# Patient Record
Sex: MALE | Race: WHITE | Employment: OTHER | URBAN - METROPOLITAN AREA
[De-identification: names, ages, dates, MRNs, and addresses within clinical notes are randomized per-mention and may not be internally consistent; named-entity substitution may affect disease eponyms.]

---

## 2019-04-05 ENCOUNTER — OFFICE VISIT (OUTPATIENT)
Dept: NEUROLOGY | Age: 79
End: 2019-04-05

## 2019-04-05 VITALS
TEMPERATURE: 97.6 F | OXYGEN SATURATION: 98 % | DIASTOLIC BLOOD PRESSURE: 80 MMHG | RESPIRATION RATE: 18 BRPM | SYSTOLIC BLOOD PRESSURE: 144 MMHG | HEART RATE: 71 BPM | HEIGHT: 72 IN | WEIGHT: 182 LBS | BODY MASS INDEX: 24.65 KG/M2

## 2019-04-05 DIAGNOSIS — R26.9 GAIT ABNORMALITY: ICD-10-CM

## 2019-04-05 DIAGNOSIS — I69.354 HEMIPARESIS AFFECTING LEFT SIDE AS LATE EFFECT OF CEREBROVASCULAR ACCIDENT (CVA) (HCC): ICD-10-CM

## 2019-04-05 DIAGNOSIS — R29.6 FREQUENT FALLS: ICD-10-CM

## 2019-04-05 DIAGNOSIS — R25.8 BRADYKINESIA: Primary | ICD-10-CM

## 2019-04-05 DIAGNOSIS — R25.8 BRADYKINESIA: ICD-10-CM

## 2019-04-05 RX ORDER — MECLIZINE HCL 12.5 MG 12.5 MG/1
6.25-12.5 TABLET ORAL
COMMUNITY

## 2019-04-05 RX ORDER — ASPIRIN 325 MG
325 TABLET ORAL DAILY
COMMUNITY
End: 2019-05-13 | Stop reason: ALTCHOICE

## 2019-04-05 RX ORDER — OXYBUTYNIN CHLORIDE 15 MG/1
1 TABLET, EXTENDED RELEASE ORAL DAILY
COMMUNITY
Start: 2019-03-28 | End: 2019-05-01

## 2019-04-05 RX ORDER — METFORMIN HYDROCHLORIDE 1000 MG/1
1 TABLET ORAL 2 TIMES DAILY WITH MEALS
COMMUNITY
Start: 2019-01-31

## 2019-04-05 RX ORDER — LOSARTAN POTASSIUM 50 MG/1
1 TABLET ORAL DAILY
COMMUNITY
Start: 2019-03-29

## 2019-04-05 NOTE — PROGRESS NOTES
Chief Complaint Patient presents with  New Patient  Neurologic Problem  
  imbalance with multiple falls since CVA in 2015 along with weakness on L side  Went to [facility]. Unsure if had neuro consult afterwards Record request faxed to [facility] for imaging, lab, hosp notes and [facility] for PT notes

## 2019-04-05 NOTE — PATIENT INSTRUCTIONS
Preventing Falls: Care Instructions Your Care Instructions Getting around your home safely can be a challenge if you have injuries or health problems that make it easy for you to fall. Loose rugs and furniture in walkways are among the dangers for many older people who have problems walking or who have poor eyesight. People who have conditions such as arthritis, osteoporosis, or dementia also have to be careful not to fall. You can make your home safer with a few simple measures. Follow-up care is a key part of your treatment and safety. Be sure to make and go to all appointments, and call your doctor if you are having problems. It's also a good idea to know your test results and keep a list of the medicines you take. How can you care for yourself at home? Taking care of yourself · You may get dizzy if you do not drink enough water. To prevent dehydration, drink plenty of fluids, enough so that your urine is light yellow or clear like water. Choose water and other caffeine-free clear liquids. If you have kidney, heart, or liver disease and have to limit fluids, talk with your doctor before you increase the amount of fluids you drink. · Exercise regularly to improve your strength, muscle tone, and balance. Walk if you can. Swimming may be a good choice if you cannot walk easily. · Have your vision and hearing checked each year or any time you notice a change. If you have trouble seeing and hearing, you might not be able to avoid objects and could lose your balance. · Know the side effects of the medicines you take. Ask your doctor or pharmacist whether the medicines you take can affect your balance. Sleeping pills or sedatives can affect your balance. · Limit the amount of alcohol you drink. Alcohol can impair your balance and other senses. · Ask your doctor whether calluses or corns on your feet need to be removed.  If you wear loose-fitting shoes because of calluses or corns, you can lose your balance and fall. · Talk to your doctor if you have numbness in your feet. Preventing falls at home · Remove raised doorway thresholds, throw rugs, and clutter. Repair loose carpet or raised areas in the floor. · Move furniture and electrical cords to keep them out of walking paths. · Use nonskid floor wax, and wipe up spills right away, especially on ceramic tile floors. · If you use a walker or cane, put rubber tips on it. If you use crutches, clean the bottoms of them regularly with an abrasive pad, such as steel wool. · Keep your house well lit, especially [place] Vanda, and outside walkways. Use night-lights in areas such as hallways and bathrooms. Add extra light switches or use remote switches (such as switches that go on or off when you clap your hands) to make it easier to turn lights on if you have to get up during the night. · Install sturdy handrails on stairways. · Move items in your cabinets so that the things you use a lot are on the lower shelves (about waist level). · Keep a cordless phone and a flashlight with new batteries by your bed. If possible, put a phone in each of the main rooms of your house, or carry a cell phone in case you fall and cannot reach a phone. Or, you can wear a device around your neck or wrist. You push a button that sends a signal for help. · Wear low-heeled shoes that fit well and give your feet good support. Use footwear with nonskid soles. Check the heels and soles of your shoes for wear. Repair or replace worn heels or soles. · Do not wear socks without shoes on wood floors. · Walk on the grass when the sidewalks are slippery. If you live in an area that gets snow and ice in the winter, sprinkle salt on slippery steps and sidewalks. Preventing falls in the bath · Install grab bars and nonskid mats inside and outside your shower or tub and near the toilet and sinks. · Use shower chairs and bath benches. · Use a hand-held shower head that will allow you to sit while showering. · Get into a tub or shower by putting the weaker leg in first. Get out of a tub or shower with your strong side first. 
· Repair loose toilet seats and consider installing a raised toilet seat to make getting on and off the toilet easier. · Keep your bathroom door unlocked while you are in the shower. Where can you learn more? Go to http://fabrizio-ibeth.info/. Enter 0476 [id number] in the search box to learn more about \"Preventing Falls: Care Instructions. \" Current as of: March 15, 2018 Content Version: 11.9 © 6650-2030 [employer]. Care instructions adapted under license by [employer] (which disclaims liability or warranty for this information). If you have questions about a medical condition or this instruction, always ask your healthcare professional. [place] [address] any warranty or liability for your use of this information. How to Get Up Safely After a Fall: Care Instructions Your Care Instructions If you have injuries, health problems, or other reasons that may make it easy for you to fall at home, it is a good idea to learn how to get up safely after a fall. Learning how to get up correctly can help you avoid making an injury worse. Also, knowing what to do if you cannot get up can help you stay safe until help arrives. Follow-up care is a key part of your treatment and safety. Be sure to make and go to all appointments, and call your doctor if you are having problems. It's also a good idea to know your test results and keep a list of the medicines you take. How can you care for yourself after a fall? If you think you can get up First lie still for a few minutes and think about how you feel. If your body feels okay and you think you can get up safely, follow the rest of the steps below: 1. Look for a chair or other piece of furniture that is close to you. 2. Roll onto your side and rest. Roll by turning your head in the direction you want to roll, move your shoulder and arm, then hip and leg in the same direction. 3. Lie still for a moment to let your blood pressure adjust. 
4. Slowly push your upper body up, lift your head, and take a moment to rest. 
5. Slowly get up on your hands and knees, and crawl to the chair or other stable piece of furniture. 6. Put your hands on the chair. 7. Move one foot forward, and place it flat on the floor. Your other leg should be bent with the knee on the floor. 8. Rise slowly, turn your body, and sit in the chair. Stay seated for a bit and think about how you feel. Call for help. Even if you feel okay, let someone know what happened to you. You might not know that you have a serious injury. If you cannot get up 1. If you think you are injured after a fall or you cannot get up, try not to panic. 2. Call out for help. 3. If you have a phone within reach or you have an emergency call device, use it to call for help. 4. If you do not have a phone within reach, try to slide yourself toward it. If you cannot get to the phone, try to slide toward a door or window or a place where you think you can be heard. 5. [place] or use an object to make noise so someone might hear you. 6. If you can reach something that you can use for a pillow, place it under your head. Try to stay warm by covering yourself with a blanket or clothing while you wait for help. When should you call for help? Call 911 anytime you think you may need emergency care. For example, call if: 
  · You passed out (lost consciousness).  
  · You cannot get up after a fall.  
  · You have severe pain.  
 Call your doctor now or seek immediate medical care if: 
  · You have new or worse pain.  
  · You are dizzy or lightheaded.  
  · You hit your head.  
 Watch closely for changes in your health, and be sure to contact your doctor if:   · You do not get better as expected. Where can you learn more? Go to http://fabrizio-ibeth.info/. Enter E650 in the search box to learn more about \"How to Get Up Safely After a Fall: Care Instructions. \" Current as of: March 15, 2018 Content Version: 11.9 © 2526-5976 [email or web address], [employer]. Care instructions adapted under license by [employer] (which disclaims liability or warranty for this information). If you have questions about a medical condition or this instruction, always ask your healthcare professional. [address] any warranty or liability for your use of this information.

## 2019-04-05 NOTE — PROGRESS NOTES
[facility] [place] [facility] [address] at [facility] at [facility] [id number] [place], [address] [address], [address] [place] [address] 
[phone] Office 
[phone] Referring: Self Chief Complaint Patient presents with  New Patient  Neurologic Problem  
  imbalance with multiple falls since CVA in 2015 along with weakness on L side  Went to [facility]. Unsure if had neuro consult afterwards 72-year-old gentleman who presents today coming by his wife for complaints of balance difficulty. His wife provides most of the history although neither are great historians. My understanding is this gentleman had a stroke April 19, 2015 and he was treated at [facility].  No etiology for the stroke was given as far as the family knows however he has been following with vascular surgery and Dr. Crissy Floyd did a carotid endarterectomy in December 2016. Patient is on aspirin. He has diabetes and troll. Since his stroke he has had difficulty with ambulating but his wife feels that he has had increasing and progressive difficulty with ambulating. He said physical therapy off and on since his stroke. She discussed his issues with Dr. Rasta Parks of ENT who suggested the patient have some balance therapy. He has been getting about 4 weeks of that thus far and apparently the therapist have been concerned that there may be a neurologic process ongoing and recommended a neurologic evaluation. They were supposed to send some information however I do not have that either in the computer system or in the paper world here in the office. Wife is unsure as to what they were concerned about. She does say that he falls frequently and she tracks it and keeps a list of when he falls down but she does not have that with her today.   She believes she is fallen in excess of 8 times over the last year. He does not injure himself. He says he falls in any direction but primarily he tends to fall backwards. He believes is been ongoing for about 2 years now. He does not believe his voice is changed but his wife thinks he is talking softer. She believes she moves slowly. There is no tremor. He has baseline left-sided weakness. She believes he shuffles his feet. He says that he drags the left foot from the stroke. He is not had any recent chest pain or palpitations. No fever or chills. Feels generally weak. No spinning of the room. Past Medical History:  
Diagnosis Date  Diabetes ([facility] 2008  Prostate cancer ([facility] 08/2000  Stroke ([facility] 04/19/2015 Past Surgical History:  
Procedure Laterality Date  HX PROSTATECTOMY  08/2000  VASCULAR SURGERY PROCEDURE UNLIST  12/09/2016  
 carotid artery Allergies Allergen Reactions  Ciprofloxacin Shortness of Breath  Levaquin [Levofloxacin] Shortness of Breath  Guaifenesin Unknown (comments) Used with Cipro so unsure of true allergy Social History Tobacco Use  Smoking status: Never Smoker  Smokeless tobacco: Never Used Substance Use Topics  Alcohol use: Not Currently  Drug use: Never Family History Problem Relation Age of Onset  Heart Disease Mother  Prostate Cancer Father Review of Systems Pertinent positives and negatives as noted with remainder of comprehensive review negative Examination Visit Vitals /80 (BP 1 Location: Left arm, BP Patient Position: Sitting) Pulse 71 Temp 97.6 °F (36.4 °C) (Oral) Resp 18 Ht 6' (1.829 m) Wt 82.6 kg (182 lb) SpO2 98% BMI 24.68 kg/m² Pleasant. Dress and grooming are appropriate. No scleral icterus is present. Oropharynx is clear. Supple neck without bruit appreciated. Heart regular.   Pulses are symmetrical.  No edema in the lower extremities. He has a paucity of spontaneous speech Neurologically, he is awake, alert, and he is oriented. He has a paucity of spontaneous speech. He is slow to respond. He is slightly dysarthric. He is quite hypophonic. No drooling is noted. He has reactive pupils. Disc margins are not well seen. He has full horizontal gaze aversion and there is a suggestion of some decreased downward gaze bilaterally. He has a left facial droop. Tongue and palate are midline. Motor examination finds that he has masked facies. He is bradykinetic. He has increased tone in the left upper and lower extremity versus the right. He does have some mild cogwheeling at the wrists bilaterally and this seems greater on the right than the left. No visible tremor today. He has no pronation and no drift. He resists fully in the right upper and lower extremity to direct testing in all groups. In the left he is 4+/5 at the deltoid and triceps and . Remainder full. In the lower extremity 4+/5 at the psoas muscle and with the anterior tibialis he has foot drop. He has brisk reflexes left upper and lower versus the right with Serjio in place. Toes withdrawal.  He has no ataxia. He ambulates with a Rollator normally. He is able to get off of the examination table with some assistance for balance. He drags the left foot catching it on the stool/step coming off of the table. He ambulates with a short stride and he has a left hemiparetic type gait but in addition he also shuffles more so with the left due to the stroke but he shuffles with his right as well. He is imbalance when trying to sit down in the chair but he does reach behind him to orient himself to the chair before he sits as he has been instructed with therapy. He has no Romberg sign present. Remainder sensory is intact to primary. Impression/Plan Interesting 79-year-old gentleman with what sounds to be a progressive gait disturbance status post stroke in 2015 although no etiology known he did have a carotid endarterectomy right sided which would seem to indicate carotid disease was his etiology. He continues to modify modifiable risk factors for stroke and he should continue to do so including his aspirin and blood pressure control and keeping his diabetes under control as well. The gait disorder frequent falls are interesting in that he does have baseline hemiparesis secondary to the stroke left-sided and that certainly going to affect his gait and make him more prone to falls however his mask facies bradykinesia gaze limitation and small stride give him a parkinsonian type appearance. The gaze limitation would suggest a parkinsonian syndrome rather than a true Parkinson's and he has no tremor as would be more common with an idiopathic Parkinson's type syndrome. This also could be vascular. No medications he has listed would be inferred as causative We will get records from [facility] 
 
We will send him for an updated MRI of the brain particularly looking for midbrain atrophy as would be seen with multiple systems atrophy. EEG secondary to his slowness of responses etc.  We will also get an EMG to evaluate for any potential confounding that a diabetic peripheral neuropathy may be having in terms of his imbalance also looking for any neuromuscular explanation. When he returns if we do not have an answer based upon the test above solidified then it may be worthwhile to give him a Sinemet trial 
 
Follow-up after his testing Emiliano Anaya MD 
 
This note was created using voice recognition software. Despite editing, there may be syntax errors. This note will not be viewable in [address]

## 2019-04-10 ENCOUNTER — OFFICE VISIT (OUTPATIENT)
Dept: NEUROLOGY | Age: 79
End: 2019-04-10

## 2019-04-10 DIAGNOSIS — I69.354 HEMIPARESIS AFFECTING LEFT SIDE AS LATE EFFECT OF CEREBROVASCULAR ACCIDENT (CVA) (HCC): Primary | ICD-10-CM

## 2019-04-10 DIAGNOSIS — G62.9 PERIPHERAL NERVE DISORDER: Primary | ICD-10-CM

## 2019-04-10 NOTE — PROGRESS NOTES
EMG/ NCS Report  [facility]ATION  - [facility]  [facility], [place]  [facility], [address] [place], [address]   Ph: [phone]   FAX: [phone]/ [phone]  Test Date:  4/10/2019    Patient: Jagdish Bonilla : 1940 Physician: Babatunde Self, Audrey Ruff MD   Sex: Male Height: ' \" Ref Phys: Nisha Piper MD   ID#: [id number] Weight:  lbs. Technician: Fely Montgomery     Patient History / Exam:  Bilateral LE gait disorder          EMG & NCV Findings:  Evaluation of the left Fibular motor nerve showed normal distal onset latency (5.6 ms), normal amplitude (1.2 mV), normal conduction velocity (B Fib-Ankle, 45 m/s), and normal conduction velocity (Poplt-B Fib, 67 m/s). The right Fibular motor nerve showed normal distal onset latency (4.6 ms), normal amplitude (1.4 mV), normal conduction velocity (B Fib-Ankle, 46 m/s), decreased conduction velocity (Poplt-B Fib, 34 m/s), normal distal onset latency (5.0 ms), normal amplitude (1.3 mV), normal conduction velocity (B Fib-Ankle, 44 m/s), and normal conduction velocity (Poplt-B Fib, 56 m/s). The left tibial motor and the right tibial motor nerves showed normal distal onset latency (L4.8, R4.9 ms), normal amplitude (L6.5, R4.6 mV), and normal conduction velocity (Knee-Ankle, L46, R43 m/s). The left Sup Fibular sensory nerve showed no response (Lower leg) and no response (Site 2). The right Sup Fibular sensory nerve showed no response (Lower leg). The left sural sensory and the right sural sensory nerves showed no response (Calf). All F Wave latencies were within normal limits. All F Wave left vs. right side latency differences were within normal limits. All H Reflex left vs. right side latency differences were within normal limits. Needle evaluation of the right extensor digitorum brevis, the right vastus medialis, the left vastus medialis, and the left biceps femoris (long head) muscles showed insertional activity.   All remaining muscles (as indicated in the following table) showed no evidence of electrical instability.         Impression:        ___________________________  Colin Eduardo IV, MD      Nerve Conduction Studies  Anti Sensory Summary Table     Stim Site NR Peak (ms) Norm Peak (ms) P-T Amp (µV) Norm P-T Amp Site1 Site2 Dist (cm)   Left Sup Fibular Anti Sensory (Lat ankle)  27.6°C   Lower leg NR  <4.6  >4 Lower leg Lat ankle 10.0   Site 2 NR          Right Sup Fibular Anti Sensory (Lat ankle)  28.6°C   Lower leg NR  <4.6  >4 Lower leg Lat ankle 10.0   Left Sural Anti Sensory (Lat Mall)  27.4°C   Calf NR  <4.5  >4.0 Calf Lat Mall 14.0   Right Sural Anti Sensory (Lat Mall)  29.5°C   Calf NR  <4.5  >4.0 Calf Lat Mall 14.0     Motor Summary Table     Stim Site NR Onset (ms) Norm Onset (ms) O-P Amp (mV) Norm O-P Amp Amp (Prev) (%) Site1 Site2 Dist (cm) Sandeep (m/s) Norm Sandeep (m/s)   Left Fibular Motor (Ext Dig Brev)  27.7°C   Ankle    5.6 <6.5 1.2 >1.1 100.0 Ankle Ext Dig Brev 8.0     B Fib    13.2  1.1  91.7 B Fib Ankle 34.0 45 >38   Poplt    14.7  1.0  90.9 Poplt B Fib 10.0 67 >42   Right Fibular Motor Run #1 (Ext Dig Brev)  28.8°C   Ankle    4.6 <6.5 1.4 >1.1 100.0 Ankle Ext Dig Brev 8.0     B Fib    12.4  1.2  85.7 B Fib Ankle 36.0 46 >38   Poplt    15.3  1.1  91.7 Poplt B Fib 10.0 34 >42   Lat ankle    14.7  1.1  100.0        Right Fibular Motor Run #2 (Ext Dig Brev)  28°C   Ankle    5.0 <6.5 1.3 >1.1 100.0 Ankle Ext Dig Brev 8.0     B Fib    13.0  1.1  84.6 B Fib Ankle 35.0 44 >38   Poplt    14.8  1.0  90.9 Poplt B Fib 10.0 56 >42   Left Tibial Motor (Abd Grewal Brev)  27.7°C   Ankle    4.8 <6.1 6.5 >1.1 100.0 Ankle Abd Grewal Brev 8.0     Knee    12.8  6.2  95.4 Knee Ankle 37.0 46 >39   Right Tibial Motor (Abd Grewal Brev)  28.3°C   Ankle    4.9 <6.1 4.6 >1.1 100.0 Ankle Abd Grewal Brev 8.0     Knee    13.9  2.8  60.9 Knee Ankle 39.0 43 >39     F Wave Studies     NR F-Lat (ms) Lat Norm (ms) L-R F-Lat (ms) L-R Lat Norm   Left Tibial (Mrkrs) (Abd Hallucis)  27.7°C      53.36 <56 0.00 <5.7   Right Tibial (Mrkrs) (Abd Hallucis)  27.9°C      53.36 <56 0.00 <5.7     H Reflex Studies     NR H-Lat (ms) L-R H-Lat (ms) L-R Lat Norm   Left Tibial (Gastroc)  27.7°C      40.00 0.00 <2.0   Right Tibial (Gastroc)  27.9°C      40.00 0.00 <2.0     EMG     Side Muscle Nerve Root Ins Act Fibs Psw Recrt Duration Amp Poly Comment   Right Ext Dig Brev Dp Br Peron L5, S1 Decr Nml Nml Nml Nml Nml Nml    Right VastusMed Femoral L2-4 Decr Nml Nml Nml Nml Nml Nml    Left VastusMed Femoral L2-4 Decr Nml Nml Nml Nml Nml Nml    Left BicepsFemL Sciatic L5-S2 Decr Nml Nml Nml Nml Nml Nml    Right AntTibialis Dp Br Peron L4-5 Nml Nml Nml Nml Nml Nml Nml    Left Ext Dig Brev Dp Br Peron L5, S1 Nml Nml Nml Nml Nml Nml Nml    Right BicepsFemL Sciatic L5-S2 Nml Nml Nml Nml Nml Nml Nml    Left MedGastroc Tibial S1-2 Nml Nml Nml Nml Nml Nml Nml    Left AntTibialis Dp Br Peron L4-5 Nml Nml Nml Nml Nml Nml Nml    Right MedGastroc Tibial S1-2 Nml Nml Nml Nml Nml Nml Nml                Nerve Conduction Studies  Anti Sensory Left/Right Comparison     Stim Site L Lat (ms) R Lat (ms) L-R Lat (ms) L Amp (µV) R Amp (µV) L-R Amp (%) Site1 Site2 L Sandeep (m/s) R Sandeep (m/s) L-R Sandeep (m/s)   Sup Fibular Anti Sensory (Lat ankle)  27.6°C   Lower leg       Lower leg Lat ankle      Site 2              Sural Anti Sensory (Lat Mall)  27.4°C   Calf       Calf Lat Mall        Motor Left/Right Comparison     Stim Site L Lat (ms) R Lat (ms) L-R Lat (ms) L Amp (mV) R Amp (mV) L-R Amp (%) Site1 Site2 L Sandeep (m/s) R Sandeep (m/s) L-R Sandeep (m/s)   Fibular Motor (Ext Dig Brev)  27.7°C   Ankle 5.6 5.0 0.6 1.2 1.3 7.7 Ankle Ext Dig Brev      B Fib 13.2 13.0 0.2 1.1 1.1 0.0 B Fib Ankle 45 44 1   Poplt 14.7 14.8 0.1 1.0 1.0 0.0 Poplt B Fib 67 56 11   Tibial Motor (Abd Grewal Brev)  27.7°C   Ankle 4.8 4.9 0.1 6.5 4.6 29.2 Ankle Abd Grewal Brev      Knee 12.8 13.9 1.1 6.2 2.8 54.8 Knee Ankle 46 43 3         Waveforms:

## 2019-04-10 NOTE — PROGRESS NOTES
This is an elective EMG nerve conduction of patient's lower extremities. Concerns for peripheral neuropathy. Patient history. Patient has a remarkable history of falling. Noteworthy is a remote stroke in April 2016 at [facility] with retention of left hemiparesis. He does have background diabetes of at least 10 years as I understand it. Patient does describe numbness in the extremities but no pain component. He has a variety of assistive devices to promote his safety and consistency on his feet between a cane and a Rollator etc.    Patient exam.  Patient alert and cooperative and cordial.  On direct questioning he many times defaults to his wife for answers. Cranial nerves II through XII normal.  Cerebellar testing finger-nose-finger toe to finger slow on the left side reflecting retention of left hemiparesis. On motor exam he has a left hemiparetic phenotype with bradykinesia and gets around with a Rollator. His Romberg was said to have been unrevealing. His sensory was said to been unrevealing but I noticed on the needle assessment especially with distal muscle interrogation that he had a hard time feeling any approximation to a needle stick. Reflexes uniformly depressed. Tends to exhibit slow overall animation and tends to shuffle as he walks. Again retains a left hemiparetic phenotype on his feet as well as off and overall again bradykinetic. EMG nerve conduction findings. 1.  Needle insertion probing was remarkable for diminished recruitment in select muscles of both legs but especially in the left leg reflecting downstream hemiparetic weakness features. On some occasions he had difficulty understanding the cues to animate the muscle for what it is worth. However, I did not honestly perceive any denervation-  acute or chronic and no myopathic potentials.   Motor unit recruitment, as it was, consisted of either reduced to normal number depending on his hemiparetic side and/or understanding his cueing. Motor unit morphology was normal.  Patient tolerated this part of the procedure remarkably well. 2.  The nerve conduction portion was remarkable for absence of sensory responses for either leg. The motor portion appeared to be normal and the tibial F waves and H reflexes were considered normal.  Technically the nerve conduction velocities around both fibular heads were not discrepancy but downstream waveforms and amplitudes were maintained and its significance if any is unknown. 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 Clinical correlation is advised.   FELIZ HERRON.

## 2019-04-11 NOTE — PROCEDURES
[facility] AT [place]   EEG Report    Date of Service: 4/10/2019  Referring: Dr. Estefania Mcmanus    An EEG is requested in this 79-year-old gentleman to evaluate for epileptiform abnormalities. Medications listed as Cozaar, Antivert, Glucophage, Ditropan. This tracing is obtained during the awake state. During wakefulness there are intermittent runs of posteriorly dominant and symmetric low to medium amplitude 9 cps activities which attenuate with eye opening. Lower voltage faster frequency activities are seen symmetrically over the anterior head regions.     Hyperventilation is not performed secondary to his history of stroke    Intermittent photic stimulation little alters the tracing    Sleep is not attained    Interpretation  This EEG recorded during the awake state is normal.    Gail Rain MD

## 2019-04-17 ENCOUNTER — HOSPITAL ENCOUNTER (OUTPATIENT)
Dept: MRI IMAGING | Age: 79
Discharge: HOME | End: 2019-04-17
Attending: PSYCHIATRY & NEUROLOGY
Payer: MEDICARE

## 2019-04-17 DIAGNOSIS — R25.8 BRADYKINESIA: ICD-10-CM

## 2019-04-17 DIAGNOSIS — R26.9 GAIT ABNORMALITY: ICD-10-CM

## 2019-04-17 DIAGNOSIS — R29.6 FREQUENT FALLS: ICD-10-CM

## 2019-04-17 DIAGNOSIS — I69.354 HEMIPARESIS AFFECTING LEFT SIDE AS LATE EFFECT OF CEREBROVASCULAR ACCIDENT (CVA) (HCC): ICD-10-CM

## 2019-04-17 PROCEDURE — 70551 MRI BRAIN STEM W/O DYE: CPT

## 2019-04-18 ENCOUNTER — TELEPHONE (OUTPATIENT)
Dept: NEUROLOGY | Age: 79
End: 2019-04-18

## 2019-04-18 NOTE — TELEPHONE ENCOUNTER
----- Message from Odette Favre, MD sent at 4/18/2019  5:04 PM EDT -----  Pt has evidence of CVA on his MRI  Please schedule follow up appointment      ----- Message -----  From: Dmitriy Salinas Results In  Sent: 4/17/2019   1:14 PM  To:  Odette Favre, MD

## 2019-04-25 ENCOUNTER — OFFICE VISIT (OUTPATIENT)
Dept: NEUROLOGY | Age: 79
End: 2019-04-25

## 2019-04-25 VITALS
SYSTOLIC BLOOD PRESSURE: 100 MMHG | RESPIRATION RATE: 16 BRPM | BODY MASS INDEX: 23.98 KG/M2 | DIASTOLIC BLOOD PRESSURE: 60 MMHG | WEIGHT: 177 LBS | HEIGHT: 72 IN

## 2019-04-25 DIAGNOSIS — R26.9 GAIT ABNORMALITY: ICD-10-CM

## 2019-04-25 DIAGNOSIS — G20 PARKINSONISM, UNSPECIFIED PARKINSONISM TYPE (HCC): ICD-10-CM

## 2019-04-25 DIAGNOSIS — I63.10 CEREBROVASCULAR ACCIDENT (CVA) DUE TO EMBOLISM OF PRECEREBRAL ARTERY (HCC): Primary | ICD-10-CM

## 2019-04-25 RX ORDER — CARBIDOPA AND LEVODOPA 25; 100 MG/1; MG/1
TABLET ORAL
Qty: 90 TAB | Refills: 3 | Status: SHIPPED | OUTPATIENT
Start: 2019-04-25 | End: 2019-06-14

## 2019-04-25 NOTE — PROGRESS NOTES
[facility] and [address] at [facility] at [facility] [id number] [place], [address] [facility] [address] [facility], [address] [place] [address]  
[phone] Chief Complaint Patient presents with  Follow-up EEG, EMG and MRI review Current Outpatient Medications Medication Sig Dispense Refill  losartan (COZAAR) 50 mg tablet Take 1 Tab by mouth daily.  metFORMIN (GLUCOPHAGE) 1,000 mg tablet Take 1 Tab by mouth two (2) times daily (with meals).  oxybutynin chloride XL (DITROPAN XL) 15 mg CR tablet Take 1 Tab by mouth daily.  aspirin (ASPIRIN) 325 mg tablet Take 325 mg by mouth daily.  meclizine (ANTIVERT) 12.5 mg tablet Take 12.5 mg by mouth three (3) times daily as needed. Allergies Allergen Reactions  Ciprofloxacin Shortness of Breath  Levaquin [Levofloxacin] Shortness of Breath  Guaifenesin Unknown (comments) Used with Cipro so unsure of true allergy Social History Tobacco Use  Smoking status: Never Smoker  Smokeless tobacco: Never Used Substance Use Topics  Alcohol use: Not Currently  Drug use: Never Patient returns today for follow-up after his initial consultation for difficulty with ambulation/progressive gait disturbance. We sent her for an MRI of the brain which is personally reviewed. He has a subacute stroke in the left thalamus as well as old infarction in the right and left karrie as well as in the bilateral frontal lobes. On sagittal T1-weighted images to my interpretation he does appear to have a hummingbird sign. EEG was normal. 
EMG done by Dr. Jack Jiménez demonstrated a sensory neuropathy. Documentation from [facility] and reviewed and basically with some physical therapy notes as well as notes regarding pharyngitis and cellulitis of the nasopharynx.   His wife had questions about the records sent and again I told her these were the only records that I had access to this point. There is been no clinical change since her last visit. His wife does note that after we discussed the subacute infarct she thought that perhaps a couple of weeks before their initial visit he had some twisting of his mouth on the right side. She says she is not surprised that he is had a new stroke. We discussed whether or not he is ever been evaluated for occult paroxysmal atrial fibrillation particularly given the old bilateral strokes in the karrie on the frontal lobes as noted in the new thalamic infarct which certainly is deep but certainly would indicate that he is having multiple cerebrovascular accidents. He has not. He is never had any monitoring for such. They have never been told that he has this. He has been compliant with his aspirin. Examination Visit Vitals /70 (BP 1 Location: Right arm, BP Patient Position: Sitting) Pulse 68 Resp 16 Ht 6' (1.829 m) Wt 80.3 kg (177 lb) SpO2 98% BMI 24.01 kg/m² He is awake and alert. He is masked. No tremor. He is quite bradykinetic. He has cogwheeling at the wrist bilaterally. He quite slowly arises from the wheelchair he and he is unstable. He takes a few steps on the office and is imbalance and shuffles. He is retropulsive. Impression/Plan 72-year-old gentleman with multiple strokes as noted above bilateral including the karrie as well as the frontal lobes and a new subacute infarct in the thalamus. Discussed potential for paroxysmal atrial fibrillation. Discussed that he should see Dr. payne for an evaluation for monitoring such. Discussed the rationale behind that. Continue aspirin for now In terms of his gait disorder and parkinsonian type appearance putting this together in the setting of the MRI scan that does seem to show significant midbrain atrophy, although we need to temper that as well with the fact that he has global atrophy but I do think this is out of proportion, we discussed parkinsonian type disorders meaning this would appear to be more of a PSP type presentation and we discussed the difference between Parkinson's proper and parkinsonian syndromes and how these typically do not respond to Parkinson's type medication i.e. dopamine supplementation although there may be an initial response but then that will wane. Discussed that these are progressive disorders. Multiple questions in that regard and really tried to explain this so that its understood and expectations are in place that we are going to do a Sinemet trial but I am not too enthused or expectant that he is going to have a miraculous response to that. Discussed Sinemet as well as administration on the schedule and potential side effects including hallucinations. Will titrate up to a dose of 1 tablet 3 times daily at 8 AM, 12 noon and 4 PM using the 25/100 preparation He will follow-up with me after he has seen Dr. Sissy Anaya MD 
 
Total time: 30 min Counseling / coordination time: 20 min  
> 50% counseling / coordination?: Yes re: as documented above This note was created using voice recognition software. Despite editing, there may be syntax errors. This note will not be viewable in [address]

## 2019-04-25 NOTE — PATIENT INSTRUCTIONS
Start taking Sinemet 25/100 strength 1/2 tablet at 8 AM, 12 noon and 4 PM x1 week then start taking 1 full tablet at 8 AM, 12 noon and 4 PM thereafter If this makes you a little nauseated take it with food See Dr. Manjeet Randolph for consideration of monitoring for question of paroxysmal atrial fibrillation as a cause of your strokes seen on MRI Follow with me after you have seen Dr. Manjeet Randolph

## 2019-05-01 ENCOUNTER — OFFICE VISIT (OUTPATIENT)
Dept: CARDIOLOGY CLINIC | Age: 79
End: 2019-05-01

## 2019-05-01 VITALS
RESPIRATION RATE: 16 BRPM | BODY MASS INDEX: 24.06 KG/M2 | DIASTOLIC BLOOD PRESSURE: 82 MMHG | HEIGHT: 72 IN | HEART RATE: 84 BPM | SYSTOLIC BLOOD PRESSURE: 134 MMHG | WEIGHT: 177.6 LBS | OXYGEN SATURATION: 98 %

## 2019-05-01 DIAGNOSIS — Z86.73 HISTORY OF CVA (CEREBROVASCULAR ACCIDENT): Primary | ICD-10-CM

## 2019-05-01 NOTE — PROGRESS NOTES
HISTORY OF PRESENTING ILLNESS      Ben Valencia is a 70 y.o. male with history of diabetes, prostate cancer and recurrent stroke/TIA referred for evaluation of an underlying arrhythmic etiology as a  of his recurrent cryptogenic stroke. ACTIVE PROBLEM LIST     There are no active problems to display for this patient. PAST MEDICAL HISTORY     Past Medical History:   Diagnosis Date    Diabetes ([facility] [address] 2008    Prostate cancer ([facility] [address] 08/2000    Stroke ([facility] [address] 04/19/2015           PAST SURGICAL HISTORY     Past Surgical History:   Procedure Laterality Date    HX PROSTATECTOMY  08/2000    VASCULAR SURGERY PROCEDURE UNLIST  12/09/2016    carotid artery          ALLERGIES     Allergies   Allergen Reactions    Ciprofloxacin Shortness of Breath    Levaquin [Levofloxacin] Shortness of Breath    Guaifenesin Unknown (comments)     Used with Cipro so unsure of true allergy          FAMILY HISTORY     Family History   Problem Relation Age of Onset    Heart Disease Mother     Prostate Cancer Father     negative for cardiac disease       SOCIAL HISTORY     Social History     Socioeconomic History    Marital status:      Spouse name: Not on file    Number of children: Not on file    Years of education: Not on file    Highest education level: Not on file   Tobacco Use    Smoking status: Never Smoker    Smokeless tobacco: Never Used   Substance and Sexual Activity    Alcohol use: Not Currently    Drug use: Never         MEDICATIONS     Current Outpatient Medications   Medication Sig    carbidopa-levodopa (SINEMET)  mg per tablet 1 tablet po at 8am, 12 noon and 4pm    losartan (COZAAR) 50 mg tablet Take 1 Tab by mouth daily.  metFORMIN (GLUCOPHAGE) 1,000 mg tablet Take 1 Tab by mouth two (2) times daily (with meals).  oxybutynin chloride XL (DITROPAN XL) 15 mg CR tablet Take 1 Tab by mouth daily.  aspirin (ASPIRIN) 325 mg tablet Take 325 mg by mouth daily.     meclizine (ANTIVERT) 12.5 mg tablet Take 12.5 mg by mouth three (3) times daily as needed. No current facility-administered medications for this visit. I have reviewed the nurses notes, vitals, problem list, allergy list, medical history, family, social history and medications. REVIEW OF SYMPTOMS      General: Pt denies excessive weight gain or loss. Pt is able to conduct ADL's  HEENT: Denies blurred vision, headaches, hearing loss, epistaxis and difficulty swallowing. Respiratory: Denies cough, congestion, shortness of breath, SHRESTHA, wheezing or stridor. Cardiovascular: Denies precordial pain, palpitations, edema or PND  Gastrointestinal: Denies poor appetite, indigestion, abdominal pain or blood in stool  Genitourinary: Denies hematuria, dysuria, increased urinary frequency  Musculoskeletal: Denies joint pain or swelling from muscles or joints  Neurologic: Denies tremor, paresthesias, headache, or sensory motor disturbance  Psychiatric: Denies confusion, insomnia, depression  Integumentray: Denies rash, itching or ulcers. Hematologic: Denies easy bruising, bleeding       PHYSICAL EXAMINATION      There were no vitals filed for this visit. General: Well developed, in no acute distress. HEENT: No jaundice, oral mucosa moist, no oral ulcers  Neck: Supple, no stiffness, no lymphadenopathy, supple  Heart:  Normal S1/S2 negative S3 or S4. Regular, no murmur, gallop or rub, no jugular venous distention  Respiratory: Clear bilaterally x 4, no wheezing or rales  Abdomen:   Soft, non-tender, bowel sounds are active.   Extremities:  No edema, normal cap refill, no cyanosis. Musculoskeletal: No clubbing, no deformities  Neuro: A&Ox3, speech clear, gait stable, cooperative, no focal neurologic deficits  Skin: Skin color is normal. No rashes or lesions.  Non diaphoretic, moist.  Vascular: 2+ pulses symmetric in all extremities       DIAGNOSTIC DATA      EKG: Sinus rhythm with PACs       LABORATORY DATA    No results found for: WBC, HGBPOC, HGB, HGBP, HCTPOC, HCT, PHCT, RBCH, PLT, MCV, HGBEXT, HCTEXT, PLTEXT   No results found for: NA, K, CL, CO2, AGAP, GLU, BUN, CREA, BUCR, GFRAA, GFRNA, CA, TBIL, TBILI, GPT, SGOT, AP, TP, ALB, GLOB, AGRAT, ALT        ASSESSMENT      1. Stroke   A. Recurrent   B. Cryptogenic  2. Diabetes mellitus         PLAN     Plan for echocardiogram with bubble study and a 30-day monitor. If monitor is unrevealing we will plan for injection of a loop recorder. FOLLOW-UP     1 month with Rina Dougherty        Thank you, Harvey Chase MD ;and Dr. Claudine Tay for allowing me to participate in the care of this extraordinarily pleasant male. Please do not hesitate to contact me for further questions/concerns.          Ronn Jeffers MD  Cardiac Electrophysiology / Cardiology    [place] [address].  [address] [place] [address] [address]    Brittany Segundo  [phone] / [phone] Fax   [phone] / [phone] Fax

## 2019-05-01 NOTE — PROGRESS NOTES
Room # 4    C/o dizziness taking Antivert with relief    Visit Vitals  /82 (BP 1 Location: Left arm, BP Patient Position: Sitting)   Pulse 84   Resp 16   Ht 6' (1.829 m)   Wt 177 lb 9.6 oz (80.6 kg)   SpO2 98%   BMI 24.09 kg/m²

## 2019-05-02 ENCOUNTER — CLINICAL SUPPORT (OUTPATIENT)
Dept: CARDIOLOGY CLINIC | Age: 79
End: 2019-05-02

## 2019-05-02 DIAGNOSIS — Z86.73 HISTORY OF CVA (CEREBROVASCULAR ACCIDENT): ICD-10-CM

## 2019-05-07 ENCOUNTER — DOCUMENTATION ONLY (OUTPATIENT)
Dept: CARDIOLOGY CLINIC | Age: 79
End: 2019-05-07

## 2019-05-07 NOTE — PROGRESS NOTES
Received serious loop alert    5/6/19  5:57pm  Auto Trigger  AFib w/RVR Sustained w/rates up to 110 BPM    This is the 1st recording of AFib.   Informed Dr Nikolay Barber, NP

## 2019-05-08 ENCOUNTER — TELEPHONE (OUTPATIENT)
Dept: CARDIOLOGY CLINIC | Age: 79
End: 2019-05-08

## 2019-05-08 NOTE — TELEPHONE ENCOUNTER
Patients wife called stating they received a heart monitor over the past weekend. They have had nothing but problems so the patient doesn't want to use it.  She stated its created way too much stress   Phone: [phone]

## 2019-05-08 NOTE — TELEPHONE ENCOUNTER
Returned call, no answer, left message for Mrs. Janet Santoro to return call to [phone]. If patient is unwilling to wear monitor, they may return it to the [facility].

## 2019-05-08 NOTE — TELEPHONE ENCOUNTER
Returned call to patient's wife Ruby Doll. Patient ID and HIPAA verified. She states patient prefers not to continue with the event monitor. Advised  Mrs. Omar Washington that she could return the monitor to the [address] [place] [address]. She verbalizes understanding of all information.

## 2019-05-13 ENCOUNTER — TELEPHONE (OUTPATIENT)
Dept: CARDIOLOGY CLINIC | Age: 79
End: 2019-05-13

## 2019-05-13 RX ORDER — METOPROLOL SUCCINATE 25 MG/1
25 TABLET, EXTENDED RELEASE ORAL DAILY
Qty: 30 TAB | Refills: 3 | Status: SHIPPED | OUTPATIENT
Start: 2019-05-13 | End: 2019-06-14

## 2019-05-13 NOTE — TELEPHONE ENCOUNTER
Patient's wife states that she received a message from the pharmacy advising her of the cost for Eliquis and metoprolol and she states that she cannot afford them. She would like a cheaper alternative. Please advise.     Phone #: [phone]  Thanks

## 2019-05-13 NOTE — TELEPHONE ENCOUNTER
Called patient's wife Inga Anders, patient ID and HIPAA verified. Advised her that per KARSTEN Freire NP patient is to \"Stop Aspirin, start Toprol 25mg daily and Eliquis 5mg BID for CVA risk reduction for AF with RVR noted on 30 day monitor placed for cryptogenic stroke. Follow up with me as planned for further discussion. \"  Mrs. Rogelio Alfonso is agreeable to this plan and is aware that new prescriptions have been sent to the [facility] on patient's chart.      Future Appointments   Date Time Provider Shweta Carlton   5/17/2019  1:00 PM Inder RENEE   6/14/2019 10:00 AM Theron Ochoa NP [address] [facility]

## 2019-05-13 NOTE — PROGRESS NOTES
Stop Aspirin, start Toprol 25mg daily and Eliquis 5mg BID for CVA risk reduction for AF with RVR noted on 30 day monitor placed for cryptogenic stroke. Follow up with me as planned for further discussion.     Praveen Olivera NP

## 2019-05-13 NOTE — TELEPHONE ENCOUNTER
Returned call to [facility], patient's wife. Patient ID verified using two patient identifiers. She states the pharmacy called her and the Eliquis and Toprol are going to cost more than $300 and she can't afford that. Advised her that we could provide samples of the Eliquis and she could check with her insurance for a less costly alternative and that I would provide her with patient assistance paper work from [facility]. She is agreeable to this plan. She will  samples and paperwork on Wednesday when patient is in the office for his Echo  appointment.

## 2019-05-14 ENCOUNTER — TELEPHONE (OUTPATIENT)
Dept: CARDIOLOGY CLINIC | Age: 79
End: 2019-05-14

## 2019-05-14 NOTE — TELEPHONE ENCOUNTER
Patient's wife states that she would prefer Xarelto over Eliquis due to the cost. Please advise.     Phone #: [phone]  Thanks

## 2019-05-14 NOTE — TELEPHONE ENCOUNTER
Called . Janet Santoro, patient ID verified using two patient identifiers. She states she spoke with the pharmacist and got information on the cost of Xarelto which is less expensive. But she wants to use the 30 day free  trial card and samples of Eliquis first and apply for patient assistance before changing to Xarelto. Patient will follow up as scheduled with KARSTEN Salazar NP in June.

## 2019-05-21 ENCOUNTER — TELEPHONE (OUTPATIENT)
Dept: CARDIOLOGY CLINIC | Age: 79
End: 2019-05-21

## 2019-05-21 NOTE — TELEPHONE ENCOUNTER
[place] from [facility] is calling to speak to NP Shimon Koroma about patient's application for assistance on medication. Received provider's portion, but they have not received the patient's portion of the application. She would like to discuss this further. She also states they need to know if patient is being treated at an outpatient facility because it is marked as \"no\" on the application.      Phone: [phone]

## 2019-05-21 NOTE — TELEPHONE ENCOUNTER
Returned call to [facility] Patient Assistance spoke with Isaac Cortes. Patient ID verified using two patient identifiers. Answered all questions. They are waiting for patient to send in their part of application.

## 2019-06-07 ENCOUNTER — TELEPHONE (OUTPATIENT)
Dept: CARDIOLOGY CLINIC | Age: 79
End: 2019-06-07

## 2019-06-07 NOTE — TELEPHONE ENCOUNTER
Patients wife is on the phone. She states that the NP put him on a new medication that is causing uncontrollable bowels. She would like for the nurse to call her to see if he needs to stop taking the medication. Please advise.    Phone: [phone]  AM

## 2019-06-10 ENCOUNTER — TELEPHONE (OUTPATIENT)
Dept: NEUROLOGY | Age: 79
End: 2019-06-10

## 2019-06-10 NOTE — TELEPHONE ENCOUNTER
Pt's wife reports severe diarrhea since starting Sinemet. She states pt is not able to get to the bathroom in time and she is having to spend a lot of time cleaning multiple messes. Pt has not seen pcp for eval of GI viruses or other causes and wife states nothing has changed for pt accept for this medication.     She would like to know if Dr. Janell Gaines would recommend an alternative medication

## 2019-06-10 NOTE — TELEPHONE ENCOUNTER
----- Message from Ruben Jamil sent at 6/10/2019  9:29 AM EDT -----  Regarding: Dr Marylin Malcolm, wife, is requesting a call from the nurse because she believes the medication \"sinemet\" is causing the pt to have diarrhea. Best contact number is [phone] after noon.

## 2019-06-14 ENCOUNTER — APPOINTMENT (OUTPATIENT)
Dept: CT IMAGING | Age: 79
DRG: 312 | End: 2019-06-14
Attending: EMERGENCY MEDICINE
Payer: MEDICARE

## 2019-06-14 ENCOUNTER — OFFICE VISIT (OUTPATIENT)
Dept: CARDIOLOGY CLINIC | Age: 79
End: 2019-06-14

## 2019-06-14 ENCOUNTER — APPOINTMENT (OUTPATIENT)
Dept: GENERAL RADIOLOGY | Age: 79
DRG: 312 | End: 2019-06-14
Attending: EMERGENCY MEDICINE
Payer: MEDICARE

## 2019-06-14 ENCOUNTER — HOSPITAL ENCOUNTER (INPATIENT)
Age: 79
LOS: 1 days | Discharge: HOME HEALTH | DRG: 312 | End: 2019-06-15
Attending: EMERGENCY MEDICINE | Admitting: INTERNAL MEDICINE
Payer: MEDICARE

## 2019-06-14 VITALS
BODY MASS INDEX: 23.03 KG/M2 | OXYGEN SATURATION: 99 % | RESPIRATION RATE: 16 BRPM | HEIGHT: 72 IN | DIASTOLIC BLOOD PRESSURE: 70 MMHG | WEIGHT: 170 LBS | SYSTOLIC BLOOD PRESSURE: 126 MMHG | HEART RATE: 96 BPM

## 2019-06-14 DIAGNOSIS — I10 ESSENTIAL HYPERTENSION: ICD-10-CM

## 2019-06-14 DIAGNOSIS — E11.42 DIABETIC POLYNEUROPATHY ASSOCIATED WITH TYPE 2 DIABETES MELLITUS (HCC): Chronic | ICD-10-CM

## 2019-06-14 DIAGNOSIS — R19.7 DIARRHEA, UNSPECIFIED TYPE: ICD-10-CM

## 2019-06-14 DIAGNOSIS — R53.83 FATIGUE, UNSPECIFIED TYPE: ICD-10-CM

## 2019-06-14 DIAGNOSIS — R55 SYNCOPE, UNSPECIFIED SYNCOPE TYPE: ICD-10-CM

## 2019-06-14 DIAGNOSIS — Z86.73 HISTORY OF CVA (CEREBROVASCULAR ACCIDENT): Primary | ICD-10-CM

## 2019-06-14 DIAGNOSIS — R55 SYNCOPE AND COLLAPSE: Primary | ICD-10-CM

## 2019-06-14 DIAGNOSIS — I48.0 PAROXYSMAL ATRIAL FIBRILLATION (HCC): ICD-10-CM

## 2019-06-14 PROBLEM — R79.89 ELEVATED SERUM CREATININE: Status: ACTIVE | Noted: 2019-06-14

## 2019-06-14 PROBLEM — E11.49 TYPE 2 DIABETES MELLITUS WITH NEUROLOGIC COMPLICATION (HCC): Chronic | Status: ACTIVE | Noted: 2019-06-14

## 2019-06-14 PROBLEM — E11.40 DIABETIC NEUROPATHY (HCC): Chronic | Status: ACTIVE | Noted: 2019-06-14

## 2019-06-14 PROBLEM — C61 PROSTATE CANCER (HCC): Chronic | Status: ACTIVE | Noted: 2019-06-14

## 2019-06-14 PROBLEM — E11.9 TYPE 2 DIABETES MELLITUS WITHOUT COMPLICATION (HCC): Chronic | Status: ACTIVE | Noted: 2019-06-14

## 2019-06-14 LAB
ALBUMIN SERPL-MCNC: 3.6 G/DL (ref 3.5–5)
ALBUMIN/GLOB SERPL: 1.1 {RATIO} (ref 1.1–2.2)
ALP SERPL-CCNC: 81 U/L (ref 45–117)
ALT SERPL-CCNC: 13 U/L (ref 12–78)
ANION GAP SERPL CALC-SCNC: 5 MMOL/L (ref 5–15)
APPEARANCE UR: CLEAR
AST SERPL-CCNC: 9 U/L (ref 15–37)
BACTERIA URNS QL MICRO: NEGATIVE /HPF
BASOPHILS # BLD: 0 K/UL (ref 0–0.1)
BASOPHILS NFR BLD: 1 % (ref 0–1)
BILIRUB SERPL-MCNC: 0.9 MG/DL (ref 0.2–1)
BILIRUB UR QL: NEGATIVE
BUN SERPL-MCNC: 22 MG/DL (ref 6–20)
BUN/CREAT SERPL: 17 (ref 12–20)
CALCIUM SERPL-MCNC: 8.7 MG/DL (ref 8.5–10.1)
CHLORIDE SERPL-SCNC: 107 MMOL/L (ref 97–108)
CK MB CFR SERPL CALC: ABNORMAL % (ref 0–2.5)
CK MB SERPL-MCNC: <1 NG/ML (ref 5–25)
CK SERPL-CCNC: 30 U/L (ref 39–308)
CO2 SERPL-SCNC: 30 MMOL/L (ref 21–32)
COLOR UR: ABNORMAL
COMMENT, HOLDF: NORMAL
CREAT SERPL-MCNC: 1.3 MG/DL (ref 0.7–1.3)
DIFFERENTIAL METHOD BLD: NORMAL
EOSINOPHIL # BLD: 0.2 K/UL (ref 0–0.4)
EOSINOPHIL NFR BLD: 2 % (ref 0–7)
EPITH CASTS URNS QL MICRO: ABNORMAL /LPF
ERYTHROCYTE [DISTWIDTH] IN BLOOD BY AUTOMATED COUNT: 12.9 % (ref 11.5–14.5)
GLOBULIN SER CALC-MCNC: 3.2 G/DL (ref 2–4)
GLUCOSE BLD STRIP.AUTO-MCNC: 161 MG/DL (ref 65–100)
GLUCOSE BLD STRIP.AUTO-MCNC: 87 MG/DL (ref 65–100)
GLUCOSE SERPL-MCNC: 106 MG/DL (ref 65–100)
GLUCOSE UR STRIP.AUTO-MCNC: NEGATIVE MG/DL
HCT VFR BLD AUTO: 39.2 % (ref 36.6–50.3)
HEMOCCULT STL QL: NEGATIVE
HGB BLD-MCNC: 12.9 G/DL (ref 12.1–17)
HGB UR QL STRIP: NEGATIVE
HYALINE CASTS URNS QL MICRO: ABNORMAL /LPF (ref 0–5)
IMM GRANULOCYTES # BLD AUTO: 0 K/UL (ref 0–0.04)
IMM GRANULOCYTES NFR BLD AUTO: 0 % (ref 0–0.5)
KETONES UR QL STRIP.AUTO: ABNORMAL MG/DL
LEUKOCYTE ESTERASE UR QL STRIP.AUTO: NEGATIVE
LIPASE SERPL-CCNC: 156 U/L (ref 73–393)
LYMPHOCYTES # BLD: 1.4 K/UL (ref 0.8–3.5)
LYMPHOCYTES NFR BLD: 20 % (ref 12–49)
MAGNESIUM SERPL-MCNC: 2 MG/DL (ref 1.6–2.4)
MCH RBC QN AUTO: 29.4 PG (ref 26–34)
MCHC RBC AUTO-ENTMCNC: 32.9 G/DL (ref 30–36.5)
MCV RBC AUTO: 89.3 FL (ref 80–99)
MONOCYTES # BLD: 0.6 K/UL (ref 0–1)
MONOCYTES NFR BLD: 9 % (ref 5–13)
NEUTS SEG # BLD: 4.7 K/UL (ref 1.8–8)
NEUTS SEG NFR BLD: 68 % (ref 32–75)
NITRITE UR QL STRIP.AUTO: NEGATIVE
NRBC # BLD: 0 K/UL (ref 0–0.01)
NRBC BLD-RTO: 0 PER 100 WBC
PH UR STRIP: 6.5 [PH] (ref 5–8)
PLATELET # BLD AUTO: 258 K/UL (ref 150–400)
PMV BLD AUTO: 10.3 FL (ref 8.9–12.9)
POTASSIUM SERPL-SCNC: 3.9 MMOL/L (ref 3.5–5.1)
PROT SERPL-MCNC: 6.8 G/DL (ref 6.4–8.2)
PROT UR STRIP-MCNC: NEGATIVE MG/DL
RBC # BLD AUTO: 4.39 M/UL (ref 4.1–5.7)
RBC #/AREA URNS HPF: ABNORMAL /HPF (ref 0–5)
SAMPLES BEING HELD,HOLD: NORMAL
SERVICE CMNT-IMP: ABNORMAL
SERVICE CMNT-IMP: NORMAL
SODIUM SERPL-SCNC: 142 MMOL/L (ref 136–145)
SP GR UR REFRACTOMETRY: 1.02 (ref 1–1.03)
TROPONIN I SERPL-MCNC: <0.05 NG/ML
UA: UC IF INDICATED,UAUC: ABNORMAL
UROBILINOGEN UR QL STRIP.AUTO: 0.2 EU/DL (ref 0.2–1)
WBC # BLD AUTO: 6.9 K/UL (ref 4.1–11.1)
WBC URNS QL MICRO: ABNORMAL /HPF (ref 0–4)

## 2019-06-14 PROCEDURE — 82272 OCCULT BLD FECES 1-3 TESTS: CPT

## 2019-06-14 PROCEDURE — C1758 CATHETER, URETERAL: HCPCS

## 2019-06-14 PROCEDURE — 74011250636 HC RX REV CODE- 250/636: Performed by: EMERGENCY MEDICINE

## 2019-06-14 PROCEDURE — 81001 URINALYSIS AUTO W/SCOPE: CPT

## 2019-06-14 PROCEDURE — 74011250637 HC RX REV CODE- 250/637: Performed by: INTERNAL MEDICINE

## 2019-06-14 PROCEDURE — 82550 ASSAY OF CK (CPK): CPT

## 2019-06-14 PROCEDURE — 70450 CT HEAD/BRAIN W/O DYE: CPT

## 2019-06-14 PROCEDURE — 99285 EMERGENCY DEPT VISIT HI MDM: CPT

## 2019-06-14 PROCEDURE — 96374 THER/PROPH/DIAG INJ IV PUSH: CPT

## 2019-06-14 PROCEDURE — 74011250636 HC RX REV CODE- 250/636: Performed by: INTERNAL MEDICINE

## 2019-06-14 PROCEDURE — 77030010545

## 2019-06-14 PROCEDURE — 83036 HEMOGLOBIN GLYCOSYLATED A1C: CPT

## 2019-06-14 PROCEDURE — 82962 GLUCOSE BLOOD TEST: CPT

## 2019-06-14 PROCEDURE — 84484 ASSAY OF TROPONIN QUANT: CPT

## 2019-06-14 PROCEDURE — 71045 X-RAY EXAM CHEST 1 VIEW: CPT

## 2019-06-14 PROCEDURE — 83735 ASSAY OF MAGNESIUM: CPT

## 2019-06-14 PROCEDURE — 51798 US URINE CAPACITY MEASURE: CPT

## 2019-06-14 PROCEDURE — 77030012890

## 2019-06-14 PROCEDURE — 65660000000 HC RM CCU STEPDOWN

## 2019-06-14 PROCEDURE — 36415 COLL VENOUS BLD VENIPUNCTURE: CPT

## 2019-06-14 PROCEDURE — 83690 ASSAY OF LIPASE: CPT

## 2019-06-14 PROCEDURE — 80053 COMPREHEN METABOLIC PANEL: CPT

## 2019-06-14 PROCEDURE — 96361 HYDRATE IV INFUSION ADD-ON: CPT

## 2019-06-14 PROCEDURE — 85025 COMPLETE CBC W/AUTO DIFF WBC: CPT

## 2019-06-14 PROCEDURE — 93005 ELECTROCARDIOGRAM TRACING: CPT

## 2019-06-14 RX ORDER — INSULIN LISPRO 100 [IU]/ML
INJECTION, SOLUTION INTRAVENOUS; SUBCUTANEOUS
Status: DISCONTINUED | OUTPATIENT
Start: 2019-06-14 | End: 2019-06-15 | Stop reason: HOSPADM

## 2019-06-14 RX ORDER — OXYCODONE AND ACETAMINOPHEN 5; 325 MG/1; MG/1
1 TABLET ORAL
Status: DISCONTINUED | OUTPATIENT
Start: 2019-06-14 | End: 2019-06-15 | Stop reason: HOSPADM

## 2019-06-14 RX ORDER — ONDANSETRON 2 MG/ML
4 INJECTION INTRAMUSCULAR; INTRAVENOUS
Status: COMPLETED | OUTPATIENT
Start: 2019-06-14 | End: 2019-06-14

## 2019-06-14 RX ORDER — CARBIDOPA AND LEVODOPA 25; 100 MG/1; MG/1
1 TABLET ORAL 3 TIMES DAILY
COMMUNITY
End: 2019-06-15

## 2019-06-14 RX ORDER — ACETAMINOPHEN 325 MG/1
650 TABLET ORAL
Status: DISCONTINUED | OUTPATIENT
Start: 2019-06-14 | End: 2019-06-15 | Stop reason: HOSPADM

## 2019-06-14 RX ORDER — MECLIZINE HCL 12.5 MG 12.5 MG/1
6.25 TABLET ORAL
Status: DISCONTINUED | OUTPATIENT
Start: 2019-06-14 | End: 2019-06-15 | Stop reason: HOSPADM

## 2019-06-14 RX ORDER — SODIUM CHLORIDE 9 MG/ML
75 INJECTION, SOLUTION INTRAVENOUS CONTINUOUS
Status: DISCONTINUED | OUTPATIENT
Start: 2019-06-14 | End: 2019-06-15 | Stop reason: HOSPADM

## 2019-06-14 RX ORDER — LOSARTAN POTASSIUM 50 MG/1
50 TABLET ORAL DAILY
Status: DISCONTINUED | OUTPATIENT
Start: 2019-06-15 | End: 2019-06-15 | Stop reason: HOSPADM

## 2019-06-14 RX ORDER — MAGNESIUM SULFATE 100 %
4 CRYSTALS MISCELLANEOUS AS NEEDED
Status: DISCONTINUED | OUTPATIENT
Start: 2019-06-14 | End: 2019-06-15 | Stop reason: HOSPADM

## 2019-06-14 RX ORDER — DEXTROSE 50 % IN WATER (D50W) INTRAVENOUS SYRINGE
25-50 AS NEEDED
Status: DISCONTINUED | OUTPATIENT
Start: 2019-06-14 | End: 2019-06-15 | Stop reason: HOSPADM

## 2019-06-14 RX ORDER — PROCHLORPERAZINE EDISYLATE 5 MG/ML
10 INJECTION INTRAMUSCULAR; INTRAVENOUS
Status: DISCONTINUED | OUTPATIENT
Start: 2019-06-14 | End: 2019-06-15 | Stop reason: HOSPADM

## 2019-06-14 RX ORDER — MECLIZINE HCL 12.5 MG 12.5 MG/1
12.5 TABLET ORAL
Status: DISCONTINUED | OUTPATIENT
Start: 2019-06-14 | End: 2019-06-15 | Stop reason: HOSPADM

## 2019-06-14 RX ORDER — SODIUM CHLORIDE 0.9 % (FLUSH) 0.9 %
5-40 SYRINGE (ML) INJECTION EVERY 8 HOURS
Status: DISCONTINUED | OUTPATIENT
Start: 2019-06-14 | End: 2019-06-15 | Stop reason: HOSPADM

## 2019-06-14 RX ORDER — METFORMIN HYDROCHLORIDE 500 MG/1
1000 TABLET ORAL 2 TIMES DAILY WITH MEALS
Status: DISCONTINUED | OUTPATIENT
Start: 2019-06-14 | End: 2019-06-15 | Stop reason: HOSPADM

## 2019-06-14 RX ORDER — ONDANSETRON 2 MG/ML
4 INJECTION INTRAMUSCULAR; INTRAVENOUS
Status: DISPENSED | OUTPATIENT
Start: 2019-06-14 | End: 2019-06-14

## 2019-06-14 RX ORDER — SODIUM CHLORIDE 0.9 % (FLUSH) 0.9 %
5-40 SYRINGE (ML) INJECTION AS NEEDED
Status: DISCONTINUED | OUTPATIENT
Start: 2019-06-14 | End: 2019-06-15 | Stop reason: HOSPADM

## 2019-06-14 RX ORDER — ZOLPIDEM TARTRATE 5 MG/1
5 TABLET ORAL
Status: DISCONTINUED | OUTPATIENT
Start: 2019-06-14 | End: 2019-06-15 | Stop reason: HOSPADM

## 2019-06-14 RX ADMIN — Medication 10 ML: at 19:53

## 2019-06-14 RX ADMIN — METFORMIN HYDROCHLORIDE 1000 MG: 500 TABLET ORAL at 19:53

## 2019-06-14 RX ADMIN — ONDANSETRON 4 MG: 2 INJECTION INTRAMUSCULAR; INTRAVENOUS at 11:48

## 2019-06-14 RX ADMIN — APIXABAN 5 MG: 5 TABLET, FILM COATED ORAL at 19:53

## 2019-06-14 RX ADMIN — SODIUM CHLORIDE 75 ML/HR: 9 INJECTION, SOLUTION INTRAVENOUS at 19:00

## 2019-06-14 RX ADMIN — SODIUM CHLORIDE 1000 ML: 900 INJECTION, SOLUTION INTRAVENOUS at 11:48

## 2019-06-14 NOTE — H&P
[address] [facility]  [address] [facility], [facility] 19  [phone]    Admission History and Physical      NAME:  [place]   :   1940   MRN:  [id number]     PCP:  Vidal Zamora MD     Date/Time:  2019         Subjective:     CHIEF COMPLAINT: syncope     HISTORY OF PRESENT ILLNESS:     Mr. Omar Washington is a 70 y.o.  male who is admitted with syncope. Mr. Omar Washington presented to the Emergency Department this AM complaining of syncope: this AM, in cardiologist's office, LOC for about 2 minutes, had loss of bowel continence (but has been having intermittent issues with this previously), had no loss of pulse, BP not recorded; was being followed up for PAF with RVR detected on event monitor done for recurrent TIAs and history of stroke. Has had multiple medication changes recently due to side effects, mainly diarrhea    History obtained from spouse, chart review and the patient.      Previous records reviewed, outpatient visits with Neurology and Cardiology, diagnosed with peripheral neuropathy and PAF with RVR    Past Medical History:   Diagnosis Date    Diabetes ([facility]     Diabetic neuropathy ([facility] 2019    PAF (paroxysmal atrial fibrillation) ([facility] 2019    Prostate cancer ([facility] 2000    Stroke ([facility] 2015        Past Surgical History:   Procedure Laterality Date    HX PROSTATECTOMY  2000    VASCULAR SURGERY PROCEDURE UNLIST  2016    carotid artery       Social History     Tobacco Use    Smoking status: Never Smoker    Smokeless tobacco: Never Used   Substance Use Topics    Alcohol use: Not Currently        Family History   Problem Relation Age of Onset    Heart Disease Mother     Prostate Cancer Father         Allergies   Allergen Reactions    Ciprofloxacin Shortness of Breath    Levaquin [Levofloxacin] Shortness of Breath    Guaifenesin Unknown (comments)     Used with Cipro so unsure of true allergy Prior to Admission medications    Medication Sig Start Date End Date Taking? Authorizing Provider   metoprolol succinate (TOPROL-XL) 25 mg XL tablet Take 1 Tab by mouth daily. 5/13/19   Mateusz Sanchez NP   apixaban (ELIQUIS) 5 mg tablet Take 1 Tab by mouth two (2) times a day. 5/13/19   Mateusz Sanchez NP   losartan (COZAAR) 50 mg tablet Take 1 Tab by mouth daily. 3/29/19   Provider, Historical   metFORMIN (GLUCOPHAGE) 1,000 mg tablet Take 1 Tab by mouth two (2) times daily (with meals). 1/31/19   Provider, Historical   meclizine (ANTIVERT) 12.5 mg tablet Take 12.5 mg by mouth three (3) times daily as needed.     Provider, Historical         Review of Systems:  (bold if positive, if negative)    Gen:  fatigueEyes:  ENT:  CVS:  syncopePulm:  GI:  nausea, emesis, diarrhea,  :    MS:  Skin:  Psych:  Endo:    Hem:  Renal:    Neuro:            Objective:      VITALS:    Vital signs reviewed; most recent are:    Visit Vitals  /68   Pulse 83   Temp 98 °F (36.7 °C)   Resp 18   Ht 6' (1.829 m)   Wt 77.1 kg (170 lb)   SpO2 99%   BMI 23.06 kg/m²     SpO2 Readings from Last 6 Encounters:   06/14/19 99%   06/14/19 99%   05/01/19 98%   04/05/19 98%        No intake or output data in the 24 hours ending 06/14/19 1337         Exam:     Physical Exam:    Gen: Well-developed, well-nourished, elderly, chronically ill-appearing, in no acute distress  HEENT:  Pink conjunctivae, PERRL, hearing intact to voice, moist mucous membranes  Neck: Supple, without masses, thyroid non-tender  Resp: No accessory muscle use, clear breath sounds without wheezes rales or rhonchi  Card: No murmurs, normal S1, S2 without thrills, bruits or peripheral edema  Abd:  Soft, non-tender, non-distended, normoactive bowel sounds are present, no palpable organomegaly and no detectable hernias  Lymph:  No cervical or inguinal adenopathy  Musc: No cyanosis or clubbing  Skin: No rashes or ulcers, skin turgor is good  Neuro:  Cranial nerves are grossly intact, no focal motor weakness, follows commands appropriately  Psych:  Fair insight, oriented to person, place and time, alert             Labs:    Recent Labs     06/14/19  1146   WBC 6.9   HGB 12.9   HCT 39.2        Recent Labs     06/14/19  1146      K 3.9      CO2 30   *   BUN 22*   CREA 1.30   CA 8.7   MG 2.0   ALB 3.6   TBILI 0.9   SGOT 9*   ALT 13     No results found for: GLUCPOC  No results for input(s): PH, PCO2, PO2, HCO3, FIO2 in the last 72 hours. No results for input(s): INR in the last 72 hours. No lab exists for component: INREXT    Additional testing:  Chest X-ray: no acute process, visualized by me.   Results not reviewed with Radiologist.       Assessment/Plan:       Principal Problem:    Syncope (6/14/2019)   - suspect due to arrhythmia, monitor on telemetry   - consult Cardiology    - needs to resume metoprolol if not orthostatic (may be orthostatic due to diabetic neuropathy)    Active Problems:    Elevated serum creatinine (6/14/2019)   - suspect this is baseline with CKD 3 but patient and wife are not aware of any kidney disease   - gentle hydration and follow      PAF (paroxysmal atrial fibrillation) ([facility] (6/14/2019)   - monitor on telemetry, continue anticoagulation      Type 2 diabetes mellitus with neurologic complication (HCC) (2/83/1179)/GYCABWKF neuropathy ([address] (6/14/2019)   - sensory neuropathy diagnosed on EMG, likely due to DM 2   - check A1c   - on metformin, has been on this at the same dose for some time so this is unlikely to be causing diarrhea      History of completed stroke (6/14/2019)   - anticoagulation      Prostate cancer ([address] (6/14/2019)   - outpatient follow up      Code status:   - patient is FULL CODE, no AMD on file, wife Gabriel Colin is surrogate      Total time spent on patient care: [address] discussed with: Patient, Family, Nursing Staff and Dr. Rachel Patterson    Discussed:  Code Status, Care Plan and D/C Planning    Prophylaxis:  SCD's and Eliquis    Probable Disposition:  HH PT, OT, RN           ___________________________________________________    Attending Physician: Justus Sheriff MD

## 2019-06-14 NOTE — ED NOTES
TRANSFER - OUT REPORT:    Verbal report given to Jennifer(name) on [employer]  being transferred to PCC(unit) for routine progression of care       Report consisted of patients Situation, Background, Assessment and   Recommendations(SBAR). Information from the following report(s) SBAR, Kardex, ED Summary, [facility] and Recent Results was reviewed with the receiving nurse. Lines:   Peripheral IV 05/17/19 Left Wrist (Active)       Peripheral IV 06/14/19 Left Antecubital (Active)        Opportunity for questions and clarification was provided.       Patient transported with:   Monitor  Registered Nurse

## 2019-06-14 NOTE — ED TRIAGE NOTES
Pt arrived via EMS from cardiovascular office for witnessed syncopal episode, unconscious for about 2 minutes, no loss of pulse per EMS. Pt is pale with stable VS. +incontient of BM, h/o incontinence. +vomiting during triage, vomiting new onset per wife. Denies pain. No cali blood observed to vomitus or BM.

## 2019-06-14 NOTE — PROGRESS NOTES
6/14/2019  2:40 PM  Case management note    Reason for Admission:   Syncope    Patient was at card office and had a syncopal episode. This event was associated with nausea and lose bowels. Patient has hx of CVA, DM and AF. Patient uses a rollator, cane and walker. He is mostly independent with ADL's. Patient and spouse live in 2 story home with 3 steps to enter and 14 within. Patient wife can transport on discharge. [employer] @ [address]                     RRAT Score:     19             Do you (patient/family) have any concerns for transition/discharge? Supportive family              Plan for utilizing home health: To be evaluated by PT/ OT    Current Advanced Directive/Advance Care Plan:  No             Transition of Care Plan:          1. PCP follow up  2. Card follow up  3. PT/ OT  4. Advance Care planning   5.  CM to follow until discharge    Care Management Interventions  PCP Verified by CM: Yes(eddie rodriguez nn)  Mode of Transport at Discharge: Self  Transition of Care Consult (CM Consult): Discharge Planning  Current Support Network: Lives with Spouse  Confirm Follow Up Transport: Family  Plan discussed with Pt/Family/Caregiver: Yes  Discharge Location  Discharge Placement: Home with family assistance  Shahnaz Diggs

## 2019-06-14 NOTE — CONSULTS
Deanna Matta DO  [facility] [address] [place] [address], [address] [place] [address], [address] [place] [address]                                       Office [phone],[facility] [phone]  Office [phone],[facility] [phone]      Date of  Admission: 6/14/2019 11:08 AM  PCP- María Cyr MD    Juana Dhaliwal is a 70 y.o. male admitted for Syncope [R55]. Consult requested by Deandre Chirinos MD    Assessment/Plan      Syncope  CVA  Paroxysmal atrial fibrillation  Diabetes  Diabetic neuropathy    recent echocardiogram without evidence of potential etiology of syncope. No real benefit of repeat echocardiographic data at this time. Dysrhythmia could still be etiology of his symptoms. Cannot exclude vagal episode. Recent ambulatory event monitor with paroxysmal atrial fibrillation no other significant dysrhythmia that could explain syncope. -Recommend continuing metoprolol to assist with rate controlling with paroxysmal atrial fibrillation  -Recommend to continue apixaban therapy for stroke prophylaxis giving high chads VASC score  -Continue to monitor on telemetry during inpatient stay  -Recommend obtaining orthostatics to assess for autonomic neuropathy   -Patient will be rescheduled for follow-up visit with Dr. Sandra Ochoa, can consider implantable loop recorder for further assessment of dysrhythmia as etiology of syncope    Cardiology will sign off at this time, please do not hesitate to call with any further questions or if any significant dysrhythmia is noted on telemetry during his hospitalization. I appreciate the opportunity to be involved in Mr.Gooden de la paz. See below note for details. Please do not hesitate to contact us with questions or concerns. Codey Ta DO      Subjective:  Juana Dhaliwal is a 70 y.o. male with prior history of CVA, paroxysmal atrial fibrillation, diabetes with diabetic neuropathy presents after having a syncopal episode.   Patient was recently seen by Dr. Georgann Heimlich for evaluation of cryptogenic stroke. Ambulatory heart monitoring did show evidence of paroxysmal atrial fibrillation, he was subsequently started on metoprolol therapy with apixaban. Patient was scheduled for follow-up visit in EP today. During his evaluation in the office he was sitting in the chair where he slumped over and began sliding out of the chair. He was unresponsive for about 2 minutes with evidence of bowel incontinence. Patient had spontaneous resolution of his symptoms. Patient's wife is at bedside and states that his mental status returned to baseline spontaneously in route to emergency department. He remains at his baseline mental status in the emergency department. She reports that he had one similar episode when he was admitted for his CVA in 1. No subsequent syncopal episodes since that time. He continues to take his metoprolol and apixaban without any adverse bleeding.       Past Medical History:   Diagnosis Date    Diabetes ([facility] 2008    [facility] 6/14/2019    PAF (paroxysmal atrial fibrillation) ([facility] 6/14/2019    Prostate cancer ([facility] 08/2000    Stroke ([facility] 04/19/2015      Past Surgical History:   Procedure Laterality Date    HX PROSTATECTOMY  08/2000    VASCULAR SURGERY PROCEDURE UNLIST  12/09/2016    carotid artery     Allergies   Allergen Reactions    Ciprofloxacin Shortness of Breath    Levaquin [Levofloxacin] Shortness of Breath    Guaifenesin Unknown (comments)     Used with Cipro so unsure of true allergy     Family History   Problem Relation Age of Onset    Heart Disease Mother     Prostate Cancer Father       Social History     Tobacco Use    Smoking status: Never Smoker    Smokeless tobacco: Never Used   Substance Use Topics    Alcohol use: Not Currently    Drug use: Never          Medications:    (Not in a hospital admission)  Current Facility-Administered Medications   Medication Dose Route Frequency    ondansetron (ZOFRAN) injection 4 mg  4 mg IntraVENous NOW     Current Outpatient Medications   Medication Sig    carbidopa-levodopa (SINEMET)  mg per tablet Take 1 Tab by mouth three (3) times daily. Patient takes at 8 am, noon, 4 pm    apixaban (ELIQUIS) 5 mg tablet Take 1 Tab by mouth two (2) times a day.  losartan (COZAAR) 50 mg tablet Take 1 Tab by mouth daily.  metFORMIN (GLUCOPHAGE) 1,000 mg tablet Take 1 Tab by mouth two (2) times daily (with meals).  meclizine (ANTIVERT) 12.5 mg tablet Take 6.25-12.5 mg by mouth three (3) times daily as needed for Dizziness. Facility-Administered Medications Ordered in Other Encounters   Medication Dose Route Frequency    sodium chloride (NS) flush 10 mL  10 mL IntraVENous PRN         Review of Systems:  Difficulty obtaining thorough review of systems due to underlying medical condition from prior CVA        Physical Exam:  Visit Vitals  /72   Pulse 87   Temp 98 °F (36.7 °C)   Resp 15   Ht 6' (1.829 m)   Wt 170 lb (77.1 kg)   SpO2 95%   BMI 23.06 kg/m²           Gen: Well-developed, well-nourished, in no acute distress, chronic ill appearing, difficulty communicating due to prior stroke  HEENT:  Pink conjunctivae, hearing intact to voice, moist mucous membranes  Neck: Supple,No JVD, No Carotid Bruit  Resp: No accessory muscle use, Clear breath sounds, No rales or rhonchi  Card: Normal Rate, irregular rythm,Normal S1, S2, No murmurs, rubs or gallop. No thrills.    Abd:  Soft, non-tender, non-distended, normoactive bowel sounds are present   MSK: No cyanosis or clubbing  Skin: No rashes or ulcers  Neuro:  Cranial nerves are grossly intact, moving all four extremities, no focal deficit, follows commands appropriately  Psych: Alert oriented poor insight into underlying medical conditions  LE: No edema  Vascular:Distal Pulses 2+ and symmetric        EKG: Sinus rhythm with PACs      Cardiac Testing/ Procedures:    Echocardiogram 5/17/2019  Normal LV function no significant valvular pathology    LABS:    Lab Results   Component Value Date/Time    WBC 6.9 06/14/2019 11:46 AM    HGB 12.9 06/14/2019 11:46 AM    HCT 39.2 06/14/2019 11:46 AM    PLATELET 869 38/68/4662 11:46 AM    MCV 89.3 06/14/2019 11:46 AM     Lab Results   Component Value Date/Time    Sodium 142 06/14/2019 11:46 AM    Potassium 3.9 06/14/2019 11:46 AM    Chloride 107 06/14/2019 11:46 AM    CO2 30 06/14/2019 11:46 AM    Anion gap 5 06/14/2019 11:46 AM    Glucose 106 (H) 06/14/2019 11:46 AM    BUN 22 (H) 06/14/2019 11:46 AM    Creatinine 1.30 06/14/2019 11:46 AM    BUN/Creatinine ratio 17 06/14/2019 11:46 AM    GFR est AA >60 06/14/2019 11:46 AM    GFR est non-AA 54 (L) 06/14/2019 11:46 AM    Calcium 8.7 06/14/2019 11:46 AM     Lab Results   Component Value Date/Time    CK 30 (L) 06/14/2019 11:46 AM    CK-MB Index Cannot be calculated 06/14/2019 11:46 AM    Troponin-I, Qt. <0.05 06/14/2019 11:46 AM     No results found for: APTT  No results found for: INR, PTMR, PTP, PT1, PT2        Missael Culp DO

## 2019-06-14 NOTE — ED PROVIDER NOTES
70 y.o. male with past medical history significant for stroke, diabetes, and prostate caner who presents from [facility] office via EMS for evaluation s/p syncope. Per wife, the pt was at his Cardiologist's office today for a f/u regarding his a-fib medications when he experienced a syncopal episode. The wife states that the pt \"broke out in a sweat,\" became pale, and then experienced a syncopal episode and was unresponsive for ~2 minutes. Pt also experienced bowel incontinence at that time. Of note, the pt has been experiencing intermittent bowel incontinence over the past year. Wife states that the pt was started on Eliquis ~1 month ago. Pt has a h/o stroke 4 years ago. Pt also has a h/o DM and takes Metformin daily. EMS reports a BS of 125 en route. Pt denies chest pain, blood in the stool, SOB, abd pain, fever, cough, congestion, rhinitis, leg pain, or leg swelling. There are no other acute medical concerns at this time. Social hx: no tobacco use; no EtOH use   PCP: Roseanna Patrick MD    Note written by Bettye Kapoor, as dictated by Kelly Valencia MD 11:35 AM    The history is provided by the patient. No  was used.         Past Medical History:   Diagnosis Date    Diabetes ([address] 2008    Prostate cancer ([address] 08/2000    Stroke ([facility] 04/19/2015       Past Surgical History:   Procedure Laterality Date    HX PROSTATECTOMY  08/2000    VASCULAR SURGERY PROCEDURE UNLIST  12/09/2016    carotid artery         Family History:   Problem Relation Age of Onset    Heart Disease Mother     Prostate Cancer Father        Social History     Socioeconomic History    Marital status:      Spouse name: Not on file    Number of children: Not on file    Years of education: Not on file    Highest education level: Not on file   Occupational History    Not on file   Social Needs    Financial resource strain: Not on file    Food insecurity:     Worry: Not on file Inability: Not on file    Transportation needs:     Medical: Not on file     Non-medical: Not on file   Tobacco Use    Smoking status: Never Smoker    Smokeless tobacco: Never Used   Substance and Sexual Activity    Alcohol use: Not Currently    Drug use: Never    Sexual activity: Not on file   Lifestyle    Physical activity:     Days per week: Not on file     Minutes per session: Not on file    Stress: Not on file   Relationships    Social connections:     Talks on phone: Not on file     Gets together: Not on file     Attends Baptism service: Not on file     Active member of club or organization: Not on file     Attends meetings of clubs or organizations: Not on file     Relationship status: Not on file    Intimate partner violence:     Fear of current or ex partner: Not on file     Emotionally abused: Not on file     Physically abused: Not on file     Forced sexual activity: Not on file   Other Topics Concern    Not on file   Social History Narrative    Not on file         ALLERGIES: Ciprofloxacin; Levaquin [levofloxacin]; and Guaifenesin    Review of Systems   Constitutional: Positive for diaphoresis. Negative for fever. HENT: Negative for congestion and rhinorrhea. Respiratory: Negative for cough and shortness of breath. Cardiovascular: Negative for chest pain, palpitations and leg swelling. Gastrointestinal: Negative for abdominal pain and blood in stool. Musculoskeletal: Negative for myalgias. Skin: Positive for pallor. Neurological: Positive for syncope. All other systems reviewed and are negative.       Vitals:    06/14/19 1110   BP: 118/68   Pulse: 85   Resp: 14   Temp: 98 °F (36.7 °C)   SpO2: 99%   Weight: 77.1 kg (170 lb)   Height: 6' (1.829 m)            Physical Exam   Physical Examination: General appearance - alert, chronically ill appearing, no acute distress, oriented to person, place, and time and normal appearing weight  Eyes - pupils equal and reactive, extraocular eye movements intact  Neck - supple, no significant adenopathy  Chest - clear to auscultation, no wheezes, rales or rhonchi, symmetric air entry  Heart - normal rate, regular rhythm, normal S1, S2, no murmurs, rubs, clicks or gallops  Abdomen - soft, nontender, nondistended, no masses or organomegaly  Back exam - full range of motion, no tenderness, palpable spasm or pain on motion  Neurological - alert, oriented, normal speech, no focal findings or movement disorder noted  Musculoskeletal - no joint tenderness, deformity or swelling  Extremities - peripheral pulses normal, no pedal edema, no clubbing or cyanosis  Skin -  Pallor  Rectal-brown stool, no gross blood  MDM  Number of Diagnoses or Management Options     Amount and/or Complexity of Data Reviewed  Clinical lab tests: ordered and reviewed  Tests in the radiology section of CPT®: ordered and reviewed  Decide to obtain previous medical records or to obtain history from someone other than the patient: yes  Obtain history from someone other than the patient: yes (EMS)  Review and summarize past medical records: yes  Discuss the patient with other providers: yes (hospitalist)  Independent visualization of images, tracings, or specimens: yes    Patient Progress  Patient progress: stable         Procedures    ED EKG interpretation:  Rhythm: normal sinus rhythm; and regular . Rate (approx.): 78; Axis: normal; P wave: normal; QRS interval: normal ; ST/T wave: normal;   EKG documented by Carla Zuluaga MD    Reviewed prehospital EKG, NSR, irregular, PAT    11:50 AM  Pt vomiting, given zofran. Labs, xray, head CT pending    Hospitalist Merle for Admission-Hayder  1:22 PM    ED Room Number: ER12/12  Patient Name and age:  Ben Valencia 70 y.o.  male  Working Diagnosis:   1.  Syncope and collapse      Readmission: no  Isolation Requirements:  no  Recommended Level of Care:  telemetry  Code Status:  Full

## 2019-06-14 NOTE — PROGRESS NOTES
[facility]: MED RECONCILIATION    Comments/Recommendations:   Interview with patient and spouse at the bedside. Verifies allergies listed. The patient stopped metoprolol succinate one week ago due to two weeks of diarrhea. The patient stopped Sinemet one week ago also due to diarrhea but resumed the Sinemet yesterday. Diarrhea has improved. Apixaban is a new medications started about one month ago. Medications added:     Carbidopa/levodopa    Medications removed:    Metoprolol succinate    Medications adjusted:    Meclizine changed to one half to one tablet TID prn    Allergies: Ciprofloxacin; Levaquin [levofloxacin]; and Guaifenesin    Prior to Admission Medications:       Prior to Admission Medications   Prescriptions Last Dose Informant Patient Reported? Taking? apixaban (ELIQUIS) 5 mg tablet 6/14/2019 at am Significant Other No Yes   Sig: Take 1 Tab by mouth two (2) times a day. carbidopa-levodopa (SINEMET)  mg per tablet 6/13/2019 at Unknown time Significant Other Yes Yes   Sig: Take 1 Tab by mouth three (3) times daily. Patient takes at 8 am, noon, 4 pm   losartan (COZAAR) 50 mg tablet 6/14/2019 at am Significant Other Yes Yes   Sig: Take 1 Tab by mouth daily. meclizine (ANTIVERT) 12.5 mg tablet  Significant Other Yes Yes   Sig: Take 6.25-12.5 mg by mouth three (3) times daily as needed for Dizziness. metFORMIN (GLUCOPHAGE) 1,000 mg tablet 6/14/2019 at am Significant Other Yes Yes   Sig: Take 1 Tab by mouth two (2) times daily (with meals).       Facility-Administered Medications: None      Thank you,      Aaliyah Ureña, PharmD, BCPS

## 2019-06-14 NOTE — ED NOTES
Pt Throughput: Charge Nurse on [facility]  made aware of patient's bed assignment. Maribeth Baum RN  Emergency Dept Charge RN.

## 2019-06-14 NOTE — PATIENT INSTRUCTIONS

## 2019-06-14 NOTE — PROGRESS NOTES
HISTORY OF PRESENTING ILLNESS      Bailey Benitez is a 70 y.o. male with history of diabetes, prostate cancer and recurrent stroke/TIA referred for evaluation of an underlying arrhythmic etiology as a  of his recurrent cryptogenic stroke. He wore 30 day event monitor which demonstrated AF with RVR. His aspirin was stopped, Toprol 25mg daily was started as well as Eliquis 5mg BID for CVA risk reduction. He experienced diarrhea two weeks after starting metoprolol and thus discontinued this. His wife, main caretaker,  subsequently discontinued sinemet believing that may also be contributing to GI issues. His diarrhea has resolved and sinemet was restarted yesterday, although he only took one dose. Difficult to determine whether diarrhea was r/t sinemet or metoprolol since they were both discontinued. He denies cardiac complaints aside from fatigue which is unchanged. Thus far, he is tolerant of Eliquis, although there is concern regarding the financial burden of this drug. His wife is concerned about restarting metoprolol d/t possible GI side effects and the strain that she endures as primary caregiver and medication administration times. During this office visit, patient became unresponsive, diaphoretic and positive for pallor. Syncopal event lasted approximately 2 minutes, there was no loss of pulse. 911 was dispatched to transport patient via stretcher to the ER. Patient was kept from falling from the chair, and then moved to the exam table. Vital signs remained stable. EKG showed sinus with burst of atrial tachycardia. He had loss of bowels during this episode. He was alert and oriented x3 upon arrival of EMS. BS obtained by EMS was 125.      ACTIVE PROBLEM LIST     There are no active problems to display for this patient.           PAST MEDICAL HISTORY     Past Medical History:   Diagnosis Date    Diabetes [facility]) 2008    Prostate [facility] ([facility] [address] 08/2000    [facility]19/2015           PAST SURGICAL HISTORY     Past Surgical History:   Procedure Laterality Date    HX PROSTATECTOMY  08/2000    VASCULAR SURGERY PROCEDURE UNLIST  12/09/2016    carotid artery          ALLERGIES     Allergies   Allergen Reactions    Ciprofloxacin Shortness of Breath    Levaquin [Levofloxacin] Shortness of Breath    Guaifenesin Unknown (comments)     Used with Cipro so unsure of true allergy          FAMILY HISTORY     Family History   Problem Relation Age of Onset    Heart Disease Mother     Prostate Cancer Father     negative for cardiac disease       SOCIAL HISTORY     Social History     Socioeconomic History    Marital status:      Spouse name: Not on file    Number of children: Not on file    Years of education: Not on file    Highest education level: Not on file   Tobacco Use    Smoking status: Never Smoker    Smokeless tobacco: Never Used   Substance and Sexual Activity    Alcohol use: Not Currently    Drug use: Never         MEDICATIONS     Current Outpatient Medications   Medication Sig    metoprolol succinate (TOPROL-XL) 25 mg XL tablet Take 1 Tab by mouth daily.  apixaban (ELIQUIS) 5 mg tablet Take 1 Tab by mouth two (2) times a day.  apixaban (ELIQUIS) 5 mg tablet Take 1 Tab by mouth two (2) times a day.  carbidopa-levodopa (SINEMET)  mg per tablet 1 tablet po at 8am, 12 noon and 4pm    losartan (COZAAR) 50 mg tablet Take 1 Tab by mouth daily.  metFORMIN (GLUCOPHAGE) 1,000 mg tablet Take 1 Tab by mouth two (2) times daily (with meals).  meclizine (ANTIVERT) 12.5 mg tablet Take 12.5 mg by mouth three (3) times daily as needed. No current facility-administered medications for this visit.       Facility-Administered Medications Ordered in Other Visits   Medication Dose Route Frequency    sodium chloride (NS) flush 10 mL  10 mL IntraVENous PRN       I have reviewed the nurses notes, vitals, problem list, allergy list, medical history, family, social history and medications. REVIEW OF SYMPTOMS      General: +fatigue, Pt denies excessive weight gain or loss. Pt is able to conduct ADL's  HEENT: Denies blurred vision, headaches, hearing loss, epistaxis and difficulty swallowing. Respiratory: Denies cough, congestion, shortness of breath, SHRESTHA, wheezing or stridor. Cardiovascular: Denies precordial pain, palpitations, edema or PND  Gastrointestinal: Denies poor appetite, indigestion, abdominal pain or blood in stool  Genitourinary: Denies hematuria, dysuria, increased urinary frequency  Musculoskeletal: Denies joint pain or swelling from muscles or joints  Neurologic: Denies tremor, paresthesias, headache, or sensory motor disturbance  Psychiatric: Denies confusion, insomnia, depression  Integumentray: Denies rash, itching or ulcers. Hematologic: Denies easy bruising, bleeding       PHYSICAL EXAMINATION      There were no vitals filed for this visit. General: Well developed, in no acute distress. HEENT: No jaundice, oral mucosa moist, no oral ulcers  Neck: Supple, no stiffness, no lymphadenopathy, supple  Heart:  Normal S1/S2 negative S3 or S4. Regular, no murmur, gallop or rub, no jugular venous distention  Respiratory: Clear bilaterally x 4, no wheezing or rales  Abdomen:   Soft, non-tender, bowel sounds are active.   Extremities:  No edema, normal cap refill, no cyanosis. Musculoskeletal: No clubbing, no deformities  Neuro: +mild aphasia, syncope. Skin: +pallor, diaphoresis,  Vascular: 2+ pulses symmetric in all extremities       DIAGNOSTIC DATA      EKG: sinus rhythm with atrial tachycardia       LABORATORY DATA    No results found for: WBC, HGBPOC, HGB, HGBP, HCTPOC, HCT, PHCT, RBCH, PLT, MCV, HGBEXT, HCTEXT, PLTEXT   No results found for: NA, K, CL, CO2, AGAP, GLU, BUN, CREA, BUCR, GFRAA, GFRNA, CA, TBIL, TBILI, GPT, SGOT, AP, TP, ALB, GLOB, AGRAT, ALT        ASSESSMENT      1. Stroke              A. Recurrent              B. Cryptogenic  2.   Diabetes mellitus  3. Atrial fibrillation        PLAN     Recommended re-introduction of Metoprolol after one week of Sinemet while monitoring for GI side effects. If he experiences side effects with metoprolol, will consider using diltiazem and repeating monitor if tolerated to evaluate for rate control. Advised applying for Eliquis financial assistance and discussing alternatives which would be more affordable. If these options remain too expensive, will consider coumadin clinic enrollment. Patient was safely transferred to [facility] and care was transferred to EMS without incident. ER triage was notified of patient's condition by this provider, as well as EMS report. Suspect vagal episode. FOLLOW-UP   1 month with Dr. Ramin Pulido    Thank you, Rhaul Canela MD and Dr. Junior Loyola for allowing me to participate in the care of this extraordinarily pleasant male. Please do not hesitate to contact me for further questions/concerns.        Luis Pena NP

## 2019-06-14 NOTE — PROGRESS NOTES
TRANSFER - IN REPORT:    Verbal report received from [facility]  RN(name) on Rhoda Carreon  being received from ED(unit) for routine progression of care      Report consisted of patients Situation, Background, Assessment and   Recommendations(SBAR). Information from the following report(s) SBAR, Kardex and Recent Results was reviewed with the receiving nurse. Opportunity for questions and clarification was provided. Assessment completed upon patients arrival to unit and care assumed. .. Primary Nurse Cindi Gil RN and Criss Wynn RN ED, performed a dual skin assessment on this patient No impairment noted  Kiran score is 20. Angela Thomason is little red, NO BREAKDOWN NOTED. .    Bedside and Verbal shift change report given to Simone Alba RN (oncoming nurse) by Filomena Thompson (offgoing nurse). Report included the following information SBAR, Kardex and Recent Results. José Nuñez

## 2019-06-14 NOTE — PROGRESS NOTES
Room # 4    Stopped Sinemet and diarrhea has stopped, still taking Eliquis      Concerns over cost of Eliquis    Denies any cardiac complaints at this time    Visit Vitals  /70 (BP 1 Location: Left arm, BP Patient Position: Sitting)   Pulse 96   Resp 16   Ht 6' (1.829 m)   Wt 170 lb (77.1 kg)   SpO2 99%   BMI 23.06 kg/m²

## 2019-06-15 VITALS
HEIGHT: 72 IN | BODY MASS INDEX: 23.23 KG/M2 | WEIGHT: 171.52 LBS | RESPIRATION RATE: 12 BRPM | TEMPERATURE: 97.7 F | SYSTOLIC BLOOD PRESSURE: 145 MMHG | OXYGEN SATURATION: 98 % | DIASTOLIC BLOOD PRESSURE: 64 MMHG | HEART RATE: 84 BPM

## 2019-06-15 LAB
ANION GAP SERPL CALC-SCNC: 7 MMOL/L (ref 5–15)
BUN SERPL-MCNC: 17 MG/DL (ref 6–20)
BUN/CREAT SERPL: 16 (ref 12–20)
CALCIUM SERPL-MCNC: 8 MG/DL (ref 8.5–10.1)
CHLORIDE SERPL-SCNC: 107 MMOL/L (ref 97–108)
CO2 SERPL-SCNC: 28 MMOL/L (ref 21–32)
CREAT SERPL-MCNC: 1.06 MG/DL (ref 0.7–1.3)
ERYTHROCYTE [DISTWIDTH] IN BLOOD BY AUTOMATED COUNT: 12.7 % (ref 11.5–14.5)
EST. AVERAGE GLUCOSE BLD GHB EST-MCNC: 120 MG/DL
GLUCOSE BLD STRIP.AUTO-MCNC: 184 MG/DL (ref 65–100)
GLUCOSE BLD STRIP.AUTO-MCNC: 94 MG/DL (ref 65–100)
GLUCOSE SERPL-MCNC: 106 MG/DL (ref 65–100)
HBA1C MFR BLD: 5.8 % (ref 4.2–6.3)
HCT VFR BLD AUTO: 36.7 % (ref 36.6–50.3)
HGB BLD-MCNC: 12 G/DL (ref 12.1–17)
MAGNESIUM SERPL-MCNC: 1.8 MG/DL (ref 1.6–2.4)
MCH RBC QN AUTO: 29.1 PG (ref 26–34)
MCHC RBC AUTO-ENTMCNC: 32.7 G/DL (ref 30–36.5)
MCV RBC AUTO: 88.9 FL (ref 80–99)
NRBC # BLD: 0 K/UL (ref 0–0.01)
NRBC BLD-RTO: 0 PER 100 WBC
PHOSPHATE SERPL-MCNC: 2.7 MG/DL (ref 2.6–4.7)
PLATELET # BLD AUTO: 239 K/UL (ref 150–400)
PMV BLD AUTO: 10.2 FL (ref 8.9–12.9)
POTASSIUM SERPL-SCNC: 3.8 MMOL/L (ref 3.5–5.1)
RBC # BLD AUTO: 4.13 M/UL (ref 4.1–5.7)
SERVICE CMNT-IMP: ABNORMAL
SERVICE CMNT-IMP: NORMAL
SODIUM SERPL-SCNC: 142 MMOL/L (ref 136–145)
WBC # BLD AUTO: 6.4 K/UL (ref 4.1–11.1)

## 2019-06-15 PROCEDURE — 74011636637 HC RX REV CODE- 636/637: Performed by: INTERNAL MEDICINE

## 2019-06-15 PROCEDURE — 82962 GLUCOSE BLOOD TEST: CPT

## 2019-06-15 PROCEDURE — 84100 ASSAY OF PHOSPHORUS: CPT

## 2019-06-15 PROCEDURE — 85027 COMPLETE CBC AUTOMATED: CPT

## 2019-06-15 PROCEDURE — 80048 BASIC METABOLIC PNL TOTAL CA: CPT

## 2019-06-15 PROCEDURE — 83735 ASSAY OF MAGNESIUM: CPT

## 2019-06-15 PROCEDURE — 36415 COLL VENOUS BLD VENIPUNCTURE: CPT

## 2019-06-15 PROCEDURE — 74011250637 HC RX REV CODE- 250/637: Performed by: INTERNAL MEDICINE

## 2019-06-15 PROCEDURE — 97116 GAIT TRAINING THERAPY: CPT

## 2019-06-15 PROCEDURE — 97161 PT EVAL LOW COMPLEX 20 MIN: CPT

## 2019-06-15 RX ADMIN — INSULIN LISPRO 2 UNITS: 100 INJECTION, SOLUTION INTRAVENOUS; SUBCUTANEOUS at 12:05

## 2019-06-15 RX ADMIN — LOSARTAN POTASSIUM 50 MG: 50 TABLET, FILM COATED ORAL at 09:26

## 2019-06-15 RX ADMIN — ACETAMINOPHEN 650 MG: 325 TABLET ORAL at 09:26

## 2019-06-15 RX ADMIN — Medication 10 ML: at 10:28

## 2019-06-15 RX ADMIN — METFORMIN HYDROCHLORIDE 1000 MG: 500 TABLET ORAL at 09:26

## 2019-06-15 RX ADMIN — APIXABAN 5 MG: 5 TABLET, FILM COATED ORAL at 09:26

## 2019-06-15 RX ADMIN — Medication 10 ML: at 07:27

## 2019-06-15 NOTE — DISCHARGE INSTRUCTIONS
HOSPITALIST DISCHARGE INSTRUCTIONS  NAME: Lisa Gonzalez   :  1940   MRN:  [id number]     Date/Time:  6/15/2019 12:33 PM    ADMIT DATE: 2019     DISCHARGE DATE: 6/15/2019     DISCHARGE DIAGNOSIS:  Syncope    MEDICATIONS:  · It is important that you take the medication exactly as they are prescribed. · Keep your medication in the bottles provided by the pharmacist and keep a list of the medication names, dosages, and times to be taken in your wallet. · Do not take other medications without consulting your doctor. Pain Management: per above medications    What to do at Home    Recommended diet:  Regular Diet    Recommended activity: Activity as tolerated    If you experience any of the following symptoms then please call your primary care physician or return to the emergency room if you cannot get hold of your doctor:  Fever, chills, nausea, vomiting, diarrhea, change in mentation, falling, bleeding, shortness of breath    Follow Up: Follow-up Information     Follow up With Specialties Details Why Contact Info    Swapna Haji MD Internal Medicine In 2 weeks  [address] [place] [address]  [phone]      Cheryle Malik MD Neurology  As needed [address]  [phone]              Information obtained by :  I understand that if any problems occur once I am at home I am to contact my physician. I understand and acknowledge receipt of the instructions indicated above.                                                                                                                                            Physician's or R.N.'s Signature                                                                  Date/Time                                                                                                                                              Patient or Representative Signature Date/Time\

## 2019-06-15 NOTE — PROGRESS NOTES
PHYSICAL THERAPY EVALUATION/DISCHARGE  Patient: Bailey Benitez (26 y.o. male)  Date: 6/15/2019  Primary Diagnosis: Syncope [R55]        Precautions: Universal     ASSESSMENT :  Based on the objective data described below, the patient presents with decreased strength and balance and decreased mobility. He has a history of a CVA with residual left sided weakness. At baseline he uses a walker and has had falls in the past.  Orthostatics taken by nurse this morning and she reports they were negative. Upon entering his room the patient asking to urgently use the bathroom. Performed transfer training semi reclined to sit and sit <> stand and gait training into the bathroom with assist of two (due to urgency and no walker in the room). Transfer sit to stand from the toilet with supervision using wall rail (he states he has wall rails at home). Gait training in the room and in the hallway with the rolling walker and supervision of one. Patient relates that he is at his baseline of mobility and he defers stair training. Recommend Home Physical Therapy (he agrees if his wife says agrees)  Not limited by dizziness, nausea, or shortness of breath  Further skilled acute physical therapy is not indicated at this time. PLAN :  Discharge Recommendations: Home Health  Further Equipment Recommendations for Discharge: already has a rolling walker     SUBJECTIVE:   Patient stated ? I need to go to the bathroom. ?    OBJECTIVE DATA SUMMARY:   HISTORY:    Past Medical History:   Diagnosis Date    Diabetes ([facility] [address] 2008    Diabetic neuropathy ([facility] [address] 6/14/2019    PAF (paroxysmal atrial fibrillation) ([facility] [address] 6/14/2019    Prostate cancer ([facility] [address] 08/2000    Stroke ([facility] [address] 04/19/2015     Past Surgical History:   Procedure Laterality Date    HX PROSTATECTOMY  08/2000    VASCULAR SURGERY PROCEDURE UNLIST  12/09/2016    carotid artery     Prior Level of Function/Home Situation: lives with his wife in a two story home, bedroom on the second.   3 steps to enter, he has a rollator, walker, and cane at home. At baseline he walks without assistance  with his walker. Has had falls in the past.  Personal factors and/or comorbidities impacting plan of care:     Home Situation  Home Environment: Private residence  One/Two Story Residence: Two story  Living Alone: No  Support Systems: Family member(s)  Patient Expects to be Discharged to[de-identified] Private residence  Current DME Used/Available at Home: Walker, rolling, Walker, [address] [employer] chair, Grab bars, Blood pressure cuff    EXAMINATION/PRESENTATION/DECISION MAKING:   Critical Behavior:  Neurologic State: Alert  Orientation Level: Oriented X4  Cognition: Follows commands     Hearing:   Auditory  Auditory Impairment: None    Range Of Motion:  AROM: Generally decreased, functional           PROM: Generally decreased, functional           Strength:    Strength: Generally decreased, functional                    Tone & Sensation:   Tone: Abnormal                              Coordination:  Coordination: Generally decreased, functional  Vision:      Functional Mobility:  Bed Mobility:     Supine to Sit: Moderate assistance        Transfers:  Sit to Stand: Minimum assistance  Stand to Sit: Contact guard assistance                       Balance:   Sitting: Intact  Ambulation/Gait Training:  Distance (ft): 180 Feet (ft)  Assistive Device: Walker, rolling;Gait belt  Ambulation - Level of Assistance: Contact guard assistance        Gait Abnormalities: Other(increased hip and trunk flexion)              Speed/Macrina: Slow;Pace decreased (<100 feet/min)                            Stairs:               Functional Measure:  Tinetti test:    Sitting Balance: 1  Arises: 1  Attempts to Rise: 2  Immediate Standing Balance: 1  Standing Balance: 1  Nudged: 2  Eyes Closed: 1  Turn 360 Degrees - Continuous/Discontinuous: 0  Turn 360 Degrees - Steady/Unsteady: 1  Sitting Down: 1  Balance Score: 11  Indication of Gait: 0  R Step Length/Height: 1  L Step Length/Height: 1  R Foot Clearance: 1  L Foot Clearance: 1  Step Symmetry: 0  Step Continuity: 1  Path: 1  Trunk: 0  Walking Time: 0  Gait Score: 6  Total Score: 17         Tinetti Tool Score Risk of Falls  <19 = High Fall Risk  19-24 = Moderate Fall Risk  25-28 = Low Fall Risk  Tinetti ME. Performance-Oriented Assessment of Mobility Problems in Elderly Patients. [facility] 66; [id number]. (Scoring Description: PT Bulletin Feb. 10, 1993)    Older adults: Delayne Schlatter et al, 2009; n = 1000 [facility] elderly evaluated with ABC, TANA, ADL, and IADL)  · Mean TANA score for males aged 69-68 years = 26.21(3.40)  · Mean TANA score for females age 69-68 years = 25.16(4.30)  · Mean TANA score for males over 80 years = 23.29(6.02)  · Mean TANA score for females over 80 years = 17.20(8.32)           Physical Therapy Evaluation Charge Determination   History Examination Presentation Decision-Making   MEDIUM  Complexity : 1-2 comorbidities / personal factors will impact the outcome/ POC  LOW Complexity : 1-2 Standardized tests and measures addressing body structure, function, activity limitation and / or participation in recreation  LOW Complexity : Stable, uncomplicated  LOW Complexity : FOTO score of       Based on the above components, the patient evaluation is determined to be of the following complexity level: LOW     Pain:  Pain Scale 1: Numeric (0 - 10)  Pain Intensity 1: 0     Activity Tolerance:   Not limited by dizziness, nausea, or shortness of breath  Please refer to the flowsheet for vital signs taken during this treatment. After treatment:   ?   Patient left in no apparent distress sitting up in chair  ? Patient left in no apparent distress in bed  ? Call bell left within reach  ? Nursing notified  ? Caregiver present  ? Chair alarm activated    COMMUNICATION/EDUCATION:   Communication/Collaboration:  ?   Fall prevention education was provided and the patient/caregiver indicated understanding.   ? Patient/family have participated as able and agree with findings and recommendations. ?   Patient is unable to participate in plan of care at this time.   Findings and recommendations were discussed with: Registered Nurse and Rehabilitation Attendant    Thank you for this referral.  Giuseppe Monroe, PT   Time Calculation: 28 mins

## 2019-06-15 NOTE — PROGRESS NOTES
Shift Summary  1930: Bedside and Verbal shift change report given to Yousif Ospina RN (oncoming nurse) by Nina Wagoner RN (offgoing nurse). Report included the following information SBAR, Kardex, Procedure Summary, Intake/Output, MAR, Accordion, Recent Results and Cardiac Rhythm NSr-Afib    Patient recently admitted to floor- finished orienting patient and wife to unit, performed assessment. 2000: CONCERNED THAT PATIENT IS ASPIRATING PO. Patient lungs sound slightly coarse, patient has a wet cough after drinking. Wife and patient say that this is common after he drinks because he has weak throat muscles after his stroke 4 years ago and he often \"gets a cough\" after eating or drinking. Patient and wife seem unconcerned. 2300: Patient refusing SCDs for night, graduated compression hose placed    0600: Patient slept soundly overnight. Orthostatics blood pressures performed- negative    0730: Bedside and Verbal shift change report given to Nina Wagoner RN (oncoming nurse) by Yousif Ospina RN (offgoing nurse). Report included the following information SBAR, Kardex, MAR, Accordion, Recent Results and Cardiac Rhythm SA, Afib. Care Plan Summary  Problem: Syncope  Goal: *Absence of injury  Outcome: Progressing Towards Goal  Goal: Decrease or eliminate episodes of syncope  Outcome: Progressing Towards Goal     Problem: Falls - Risk of  Goal: *Absence of Falls  Description  Document Redgie Curet Fall Risk and appropriate interventions in the flowsheet. Outcome: Progressing Towards Goal     Problem: Patient Education: Go to Patient Education Activity  Goal: Patient/Family Education  Outcome: Progressing Towards Goal     Problem: Pressure Injury - Risk of  Goal: *Prevention of pressure injury  Description  Document Kiran Scale and appropriate interventions in the flowsheet.   Outcome: Progressing Towards Goal     Problem: Patient Education: Go to Patient Education Activity  Goal: Patient/Family Education  Outcome: Progressing Towards Goal

## 2019-06-15 NOTE — PROGRESS NOTES
Karthik Castrejon karsten [place] [address]  [address] [facility], [address] [place] [address]  [phone]      Medical Progress Note      NAME: Patti Burnett   :  1940  MRM:  [id number]    Date/Time: 6/15/2019  8:02 AM         Subjective:     Chief Complaint:  Syncope: no recurrence    ROS:  (bold if positive, if negative)                        Tolerating Diet          Objective:       Vitals:          Last 24hrs VS reviewed since prior progress note.  Most recent are:    Visit Vitals  /77 (BP 1 Location: Right arm, BP Patient Position: Standing)   Pulse 80   Temp 98 °F (36.7 °C)   Resp 16   Ht 6' (1.829 m)   Wt 77.8 kg (171 lb 8.3 oz)   SpO2 95%   BMI 23.26 kg/m²     SpO2 Readings from Last 6 Encounters:   06/15/19 95%   19 99%   19 98%   19 98%            Intake/Output Summary (Last 24 hours) at 6/15/2019 0802  Last data filed at 6/15/2019 7831  Gross per 24 hour   Intake 1145 ml   Output 775 ml   Net 370 ml          Exam:     Physical Exam:    Gen:  Well-developed, well-nourished, frail, elderly, chronically ill-appearing, in no acute distress  HEENT:  Pink conjunctivae, PERRL, hearing intact to voice, moist mucous membranes  Neck:  Supple, without masses, thyroid non-tender  Resp:  No accessory muscle use, clear breath sounds without wheezes rales or rhonchi  Card:  No murmurs, normal S1, S2 without thrills, bruits or peripheral edema  Abd:  Soft, non-tender, non-distended, normoactive bowel sounds are present, no palpable organomegaly and no detectable hernias  Lymph:  No cervical or inguinal adenopathy  Musc:  No cyanosis or clubbing  Skin:  No rashes or ulcers, skin turgor is good  Neuro:  Cranial nerves are grossly intact, no focal motor weakness, follows commands appropriately  Psych:  Fair insight, oriented to person, place and time, alert       Telemetry reviewed:   normal sinus rhythm    Medications Reviewed: (see below)    Lab Data Reviewed: (see below)    ______________________________________________________________________    Medications:     Current Facility-Administered Medications   Medication Dose Route Frequency    0.9% sodium chloride infusion  75 mL/hr IntraVENous CONTINUOUS    sodium chloride (NS) flush 5-40 mL  5-40 mL IntraVENous Q8H    sodium chloride (NS) flush 5-40 mL  5-40 mL IntraVENous PRN    acetaminophen (TYLENOL) tablet 650 mg  650 mg Oral Q4H PRN    oxyCODONE-acetaminophen (PERCOCET) 5-325 mg per tablet 1 Tab  1 Tab Oral Q4H PRN    prochlorperazine (COMPAZINE) injection 10 mg  10 mg IntraVENous Q6H PRN    zolpidem (AMBIEN) tablet 5 mg  5 mg Oral QHS PRN    glucose chewable tablet 16 g  4 Tab Oral PRN    dextrose (D50W) injection syrg 12.5-25 g  25-50 mL IntraVENous PRN    glucagon (GLUCAGEN) injection 1 mg  1 mg IntraMUSCular PRN    insulin lispro (HUMALOG) injection   SubCUTAneous AC&HS    apixaban (ELIQUIS) tablet 5 mg  5 mg Oral BID    losartan (COZAAR) tablet 50 mg  50 mg Oral DAILY    meclizine (ANTIVERT) tablet 6.25 mg  6.25 mg Oral TID PRN    metFORMIN (GLUCOPHAGE) tablet 1,000 mg  1,000 mg Oral BID WITH MEALS    meclizine (ANTIVERT) tablet 12.5 mg  12.5 mg Oral TID PRN     Facility-Administered Medications Ordered in Other Encounters   Medication Dose Route Frequency    sodium chloride (NS) flush 10 mL  10 mL IntraVENous PRN            Lab Review:     Recent Labs     06/15/19  0114 06/14/19  1146   WBC 6.4 6.9   HGB 12.0* 12.9   HCT 36.7 39.2    258     Recent Labs     06/15/19  0114 06/14/19  1146    142   K 3.8 3.9    107   CO2 28 30   * 106*   BUN 17 22*   CREA 1.06 1.30   CA 8.0* 8.7   MG 1.8 2.0   PHOS 2.7  --    ALB  --  3.6   TBILI  --  0.9   SGOT  --  9*   ALT  --  13     Lab Results   Component Value Date/Time    Glucose (POC) 94 06/15/2019 06:31 AM    Glucose (POC) 161 (H) 06/14/2019 08:48 PM    Glucose (POC) 87 06/14/2019 06:39 PM     No results for input(s): PH, PCO2, PO2, HCO3, FIO2 in the last 72 hours. No results for input(s): INR in the last 72 hours.     No lab exists for component: INREXT  No results found for: SDES  No results found for: CULT         Assessment:     Principal Problem:    Syncope (6/14/2019)    Active Problems:    Elevated serum creatinine (6/14/2019)      History of completed stroke (6/14/2019)      Prostate cancer ([facility] (6/14/2019)      PAF (paroxysmal [facility] fibrillation) ([facility] (6/14/2019)      Type 2 diabetes mellitus with neurologic complication ([facility] (0/52/7461)      Diabetic neuropathy ([facility] (6/14/2019)           Plan:     Principal Problem:    Syncope (6/14/2019)   - no recurrence   - Cardiology evaluated, will arrange outpatient follow up, signed off, no further workup planned   - no significant orthostasis   - await Neurology and PT evals, may go home later today    Active Problems:    Elevated serum creatinine (6/14/2019)   - creatinine improved with hydration   - unclear baseline   - stop IV fluids   - still suspect CKD 3      History of completed stroke (6/14/2019)   - continue Eliquis      Prostate cancer ([facility] (6/14/2019)   - outpatient followup      PAF (paroxysmal atrial fibrillation) ([facility] (6/14/2019)   - continue Eliquis and metoprolol per Cardiology      Type 2 diabetes mellitus with neurologic complication ([facility] (7/71/3983)/NCXCQRGB neuropathy ([facility] (6/14/2019)   - BS okay on meds as above      Total time spent in patient care: 35 minutes                  Care Plan discussed with: Patient and Nursing Staff    Discussed:  Code Status, Care Plan and D/C Planning    Prophylaxis:  Eliquis    Disposition:  Home w/Family and HH PT, OT, RN           ___________________________________________________    Attending Physician: Rita Hurst MD

## 2019-06-15 NOTE — PROGRESS NOTES
Bedside and Verbal shift change report given to Al Monday RN (oncoming nurse) by Sandra Qureshi RN (offgoing nurse). Report included the following information SBAR, Kardex and Recent Results. .      Walking in the corridor with PT/OT. ..     1200- Family @ bedside. .    1400- CM is working to set up [facility]. .    1430- Called received form CM that HH is set up and wife is going call them. .    I have reviewed discharge instructions with the patient and spouse. The patient and spouse verbalized understanding. ... DISCHARGED the pt via WC alone with his wife. .    Discharged the pt via [facility] [address] [facility]

## 2019-06-15 NOTE — DISCHARGE SUMMARY
Physician Discharge Summary     Patient ID:  Anthony Lopez  [id number]  24 y.o.  1940    Admit date: 6/14/2019    Discharge date: 6/15/2019    Admission Diagnoses: Syncope [R55]    Discharge Diagnoses:  Principal Diagnosis Syncope                                            Principal Problem:    Syncope (6/14/2019)    Active Problems:    Elevated serum creatinine (6/14/2019)      History of completed stroke (6/14/2019)      Prostate cancer ([facility] (6/14/2019)      PAF (paroxysmal atrial fibrillation) ([facility] (6/14/2019)      Type 2 diabetes mellitus with neurologic complication ([facility] (4/39/8804)      Diabetic neuropathy ([facility] (6/14/2019)         Resolved Problems:  Problem List as of 6/15/2019 Date Reviewed: 6/14/2019          Codes Class Noted - Resolved    * (Principal) Syncope ICD-10-CM: R55  ICD-9-CM: 780.2  6/14/2019 - Present        Elevated serum creatinine ICD-10-CM: R79.89  ICD-9-CM: 790.99  6/14/2019 - Present        History of completed stroke (Chronic) ICD-10-CM: Z86.73  ICD-9-CM: V12.54  6/14/2019 - Present        [facility] ([facility] (Chronic) ICD-10-CM: C61  ICD-9-CM: 185  6/14/2019 - Present        PAF (paroxysmal atrial fibrillation) (HCC) (Chronic) ICD-10-CM: I48.0  ICD-9-CM: 427.31  6/14/2019 - Present        Type 2 diabetes mellitus with neurologic complication (HCC) (Chronic) ICD-10-CM: E11.49  ICD-9-CM: 250.60  6/14/2019 - Present        Diabetic neuropathy (HCC) (Chronic) ICD-10-CM: E11.40  ICD-9-CM: 250.60, 357.2  6/14/2019 - Present                Hospital Course:   Mr. Debo Vance was admitted to the Hospitalist Service on the 3rd floor for treatment of syncope. He had no further syncope. He was evaluated by Cardiology and they signed off immediately without further testing recommending outpatient follow up. Neurology was consulted and he was taken off his Sinemet as there was no clear indication for it and it may have been causing his diarrhea. PT cleared him for home services.     He was discharged home on 6/15/2019 in improved condition. PCP: Harvey Chase MD    Consults: Cardiology and Neurology    Discharge Exam:  See my Progress Note from today. Disposition: home    Patient Instructions:   Current Discharge Medication List      CONTINUE these medications which have NOT CHANGED    Details   apixaban (ELIQUIS) 5 mg tablet Take 1 Tab by mouth two (2) times a day. Qty: 180 Tab, Refills: 3      losartan (COZAAR) 50 mg tablet Take 1 Tab by mouth daily. metFORMIN (GLUCOPHAGE) 1,000 mg tablet Take 1 Tab by mouth two (2) times daily (with meals). meclizine (ANTIVERT) 12.5 mg tablet Take 6.25-12.5 mg by mouth three (3) times daily as needed for Dizziness. STOP taking these medications       carbidopa-levodopa (SINEMET)  mg per tablet Comments:   Reason for Stopping:              Activity: Activity as tolerated  Diet: Regular Diet  Wound Care: None needed    Follow-up Information     Follow up With Specialties Details Why Contact Info    Harvey Chase MD Internal Medicine In 2 weeks  [address] [place] [address]  [phone]      Mammie Romberg, MD Neurology  As needed [address]  [phone]            35 minutes were spent on this discharge.     Signed:  Lawson Kapoor MD  6/15/2019  12:33 PM

## 2019-06-15 NOTE — CONSULTS
NEUROLOGY IN-PATIENT NEW CONSULTATION      6/15/2019    RE: Loc Ren         1940      REFERRED BY:  Anna Smith MD      CHIEF COMPLAINT:  This is Loc Ren is a 70 y.o. male right handed who had concerns including Syncope. HPI:     Patient being followed by neurology Dr Garrick Galvez for gait disturbance. Work up done showed multiple strokes (L thalamic, bilateral karrie and bilateral frontal). Patient was sent to cardiology Dr Sissy Gutierres who found paroxysmal afib and placed on Eliquis. There was a suspicion of Parkinsonism so patient was placed on a trial of Sinemet 25/100 TID. However, patient noted lightheadedness and diarrhea with no clear benefit. During visit with Cardiology Chris Marin NP, patient was noted to be diaphoretic and became unresponsive lasting for 2 mins. EKG showed sinus tachycardia.      ROS   (-) fever  (-) rash  All other systems reviewed and are negative    Past Medical Hx  Past Medical History:   Diagnosis Date    Diabetes ([facility] 2008    Diabetic neuropathy ([address] [facility] 6/14/2019    PAF (paroxysmal atrial fibrillation) ([facility] 6/14/2019    Prostate cancer ([address] [facility] 08/2000    Stroke ([address] [facility] 04/19/2015       Social Hx  Social History     Socioeconomic History    Marital status:      Spouse name: Not on file    Number of children: Not on file    Years of education: Not on file    Highest education level: Not on file   Tobacco Use    Smoking status: Never Smoker    Smokeless tobacco: Never Used   Substance and Sexual Activity    Alcohol use: Not Currently    Drug use: Never       Family Hx  Family History   Problem Relation Age of Onset    Heart Disease Mother     Prostate Cancer Father        ALLERGIES  Allergies   Allergen Reactions    Ciprofloxacin Shortness of Breath    Levaquin [Levofloxacin] Shortness of Breath    Guaifenesin Unknown (comments)     Used with Cipro so unsure of true allergy       CURRENT MEDS  Current Facility-Administered Medications   Medication Dose Route Frequency Provider Last Rate Last Dose    0.9% sodium chloride infusion  75 mL/hr IntraVENous CONTINUOUS Jana Harding MD   Stopped at 06/15/19 0928    sodium chloride (NS) flush 5-40 mL  5-40 mL IntraVENous Q8H Jana Harding MD   10 mL at 06/15/19 1028    sodium chloride (NS) flush 5-40 mL  5-40 mL IntraVENous PRN Jana Harding MD   10 mL at 06/14/19 1953    acetaminophen (TYLENOL) tablet 650 mg  650 mg Oral Q4H PRN Jana Harding MD   650 mg at 06/15/19 0926    oxyCODONE-acetaminophen (PERCOCET) 5-325 mg per tablet 1 Tab  1 Tab Oral Q4H PRN Jana Harding MD        prochlorperazine (COMPAZINE) injection 10 mg  10 mg IntraVENous Q6H PRN Jana Harding MD        zolpidem (AMBIEN) tablet 5 mg  5 mg Oral QHS PRN Jana Harding MD        glucose chewable tablet 16 g  4 Tab Oral PRN Jana Harding MD        dextrose (D50W) injection syrg 12.5-25 g  25-50 mL IntraVENous PRN Jana Harding MD        glucagon [facility]) injection 1 mg  1 mg IntraMUSCular PRN Jana Harding MD        insulin lispro (HUMALOG) injection   SubCUTAneous AC&HS Jana Harding MD   Stopped at 06/14/19 2200    apixaban (ELIQUIS) tablet 5 mg  5 mg Oral BID Jana Harding MD   5 mg at 06/15/19 5678    losartan (COZAAR) tablet 50 mg  50 mg Oral DAILY Jana Harding MD   50 mg at 06/15/19 1139    meclizine (ANTIVERT) tablet 6.25 mg  6.25 mg Oral TID PRN Jana Harding MD        metFORMIN (GLUCOPHAGE) tablet 1,000 mg  1,000 mg Oral BID WITH MEALS Jana Harding MD   1,000 mg at 06/15/19 4718    meclizine (ANTIVERT) tablet 12.5 mg  12.5 mg Oral TID PRN Jana Harding MD         Facility-Administered Medications Ordered in Other Encounters   Medication Dose Route Frequency Provider Last Rate Last Dose    sodium chloride (NS) flush 10 mL  10 mL IntraVENous PRN Lam Chaidez MD               PREVIOUS WORKUP: (reviewed)  IMAGING:    CT Results (recent):  Results from [facility] on 06/14/19   CT HEAD WO CONT    Narrative EXAM: CT HEAD WO CONT  History: Syncope  INDICATION: syncope    COMPARISON: MR 4/17/2019. CONTRAST: None. TECHNIQUE: Unenhanced CT of the head was performed using 5 mm images. Brain and  bone windows were generated. CT dose reduction was achieved through use of a  standardized protocol tailored for this examination and automatic exposure  control for dose modulation. FINDINGS:  Extensive confluent periventricular scattered hypodensities in the cerebral  white matter. . Sulcal and ventricular prominence. . Vertebral artery vascular  calcifications are noted. Remote infarct in the karrie on the right. There is no  intracranial hemorrhage, extra-axial collection, mass, mass effect or midline  shift. The basilar cisterns are open. No acute infarct is identified. The bone  windows demonstrate no abnormalities. The visualized portions of the paranasal  sinuses and mastoid air cells are clear. Impression IMPRESSION:     Severe chronic vascular ischemic change and moderate to severe cerebral atrophy. MRI Results (recent):  Results from [place] encounter on 04/17/19   MRI BRAIN WO CONT    Narrative EXAM: MRI BRAIN WO CONT  Clinical history: Parkinsonism, evaluate for acute ischemia  INDICATION: Progressive gait disorder    COMPARISON: 12/10/2015. CONTRAST: None. TECHNIQUE:    Multiplanar multisequence acquisition without contrast of the brain. FINDINGS:  Punctate focus of subacute ischemia in the left thalamus  Parieto-occipital sulcus is prominent. Pontomesencephalic ratio is within normal  limits. Pituitary is unremarkable. Infundibulum is also unremarkable. There is  no Chiari or syrinx. Extensive confluent periventricular and scattered foci of  increased T2 signal intensity corona radiata and centrum semiovale. Remote  infarction in the right karrie. Small remote infarction in the left karrie.  Small  remote periventricular infarctions left frontal lobe, anterior right frontal  lobe as well. Chronic foci of hemosiderin deposition. Cavernous sinuses are  symmetric. Cerebellopontine angles are unremarkable. . The orbital contents are  within normal limits. No significant osseous or scalp lesions are identified. Impression IMPRESSION:   Punctate focus of subacute ischemia in the left thalamus. Small remote infarctions right and left karrie, periventricular white matter right  and left frontal lobe. Moderate temporal predominant cerebral atrophy. Severe chronic microvascular ischemic change. IR Results (recent):  No results found for this or any previous visit. VAS/US Results (recent):  No results found for this or any previous visit. LABS (reviewed)  Results for orders placed or performed during the hospital encounter of 06/14/19   SAMPLES BEING HELD   Result Value Ref Range    SAMPLES BEING HELD 1SST,1RED,1BLU     COMMENT        Add-on orders for these samples will be processed based on acceptable specimen integrity and analyte stability, which may vary by analyte. CBC WITH AUTOMATED DIFF   Result Value Ref Range    WBC 6.9 4.1 - 11.1 K/uL    RBC 4.39 4. 10 - 5.70 M/uL    HGB 12.9 12.1 - 17.0 g/dL    HCT 39.2 36.6 - 50.3 %    MCV 89.3 80.0 - 99.0 FL    MCH 29.4 26.0 - 34.0 PG    MCHC 32.9 30.0 - 36.5 g/dL    RDW 12.9 11.5 - 14.5 %    PLATELET 447 091 - 475 K/uL    MPV 10.3 8.9 - 12.9 FL    NRBC 0.0 0  WBC    ABSOLUTE NRBC 0.00 0.00 - 0.01 K/uL    NEUTROPHILS 68 32 - 75 %    LYMPHOCYTES 20 12 - 49 %    MONOCYTES 9 5 - 13 %    EOSINOPHILS 2 0 - 7 %    BASOPHILS 1 0 - 1 %    IMMATURE GRANULOCYTES 0 0.0 - 0.5 %    ABS. NEUTROPHILS 4.7 1.8 - 8.0 K/UL    ABS. LYMPHOCYTES 1.4 0.8 - 3.5 K/UL    ABS. MONOCYTES 0.6 0.0 - 1.0 K/UL    ABS. EOSINOPHILS 0.2 0.0 - 0.4 K/UL    ABS. BASOPHILS 0.0 0.0 - 0.1 K/UL    ABS. IMM.  GRANS. 0.0 0.00 - 0.04 K/UL    DF AUTOMATED     METABOLIC PANEL, COMPREHENSIVE   Result Value Ref Range    Sodium 142 136 - 145 mmol/L    Potassium 3.9 3.5 - 5.1 mmol/L    Chloride 107 97 - 108 mmol/L    CO2 30 21 - 32 mmol/L    Anion gap 5 5 - 15 mmol/L    Glucose 106 (H) 65 - 100 mg/dL    BUN 22 (H) 6 - 20 MG/DL    Creatinine 1.30 0.70 - 1.30 MG/DL    BUN/Creatinine ratio 17 12 - 20      GFR est AA >60 >60 ml/min/1.73m2    GFR est non-AA 54 (L) >60 ml/min/1.73m2    Calcium 8.7 8.5 - 10.1 MG/DL    Bilirubin, total 0.9 0.2 - 1.0 MG/DL    ALT (SGPT) 13 12 - 78 U/L    AST (SGOT) 9 (L) 15 - 37 U/L    Alk.  phosphatase 81 45 - 117 U/L    Protein, total 6.8 6.4 - 8.2 g/dL    Albumin 3.6 3.5 - 5.0 g/dL    Globulin 3.2 2.0 - 4.0 g/dL    A-G Ratio 1.1 1.1 - 2.2     CK W/ CKMB & INDEX   Result Value Ref Range    CK 30 (L) 39 - 308 U/L    CK - MB <1.0 <3.6 NG/ML    CK-MB Index Cannot be calculated 0.0 - 2.5     MAGNESIUM   Result Value Ref Range    Magnesium 2.0 1.6 - 2.4 mg/dL   URINALYSIS W/ REFLEX CULTURE   Result Value Ref Range    Color YELLOW/STRAW      Appearance CLEAR CLEAR      Specific gravity 1.016 1.003 - 1.030      pH (UA) 6.5 5.0 - 8.0      Protein NEGATIVE  NEG mg/dL    Glucose NEGATIVE  NEG mg/dL    Ketone TRACE (A) NEG mg/dL    Bilirubin NEGATIVE  NEG      Blood NEGATIVE  NEG      Urobilinogen 0.2 0.2 - 1.0 EU/dL    Nitrites NEGATIVE  NEG      Leukocyte Esterase NEGATIVE  NEG      WBC 0-4 0 - 4 /hpf    RBC 0-5 0 - 5 /hpf    Epithelial cells FEW FEW /lpf    Bacteria NEGATIVE  NEG /hpf    UA:UC IF INDICATED CULTURE NOT INDICATED BY UA RESULT CNI      Hyaline cast 0-2 0 - 5 /lpf   LIPASE   Result Value Ref Range    Lipase 156 73 - 393 U/L   OCCULT BLOOD, STOOL   Result Value Ref Range    Occult blood, stool NEGATIVE  NEG     TROPONIN I   Result Value Ref Range    Troponin-I, Qt. <0.05 <0.05 ng/mL   CBC W/O DIFF   Result Value Ref Range    WBC 6.4 4.1 - 11.1 K/uL    RBC 4.13 4.10 - 5.70 M/uL    HGB 12.0 (L) 12.1 - 17.0 g/dL    HCT 36.7 36.6 - 50.3 %    MCV 88.9 80.0 - 99.0 FL    MCH 29.1 26.0 - 34.0 PG    MCHC 32.7 30.0 - 36.5 g/dL    RDW 12.7 11.5 - 14.5 %    PLATELET 786 967 - 825 K/uL    MPV 10.2 8.9 - 12.9 FL    NRBC 0.0 0  WBC    ABSOLUTE NRBC 0.00 0.00 - 0.01 K/uL   MAGNESIUM   Result Value Ref Range    Magnesium 1.8 1.6 - 2.4 mg/dL   METABOLIC PANEL, BASIC   Result Value Ref Range    Sodium 142 136 - 145 mmol/L    Potassium 3.8 3.5 - 5.1 mmol/L    Chloride 107 97 - 108 mmol/L    CO2 28 21 - 32 mmol/L    Anion gap 7 5 - 15 mmol/L    Glucose 106 (H) 65 - 100 mg/dL    BUN 17 6 - 20 MG/DL    Creatinine 1.06 0.70 - 1.30 MG/DL    BUN/Creatinine ratio 16 12 - 20      GFR est AA >60 >60 ml/min/1.73m2    GFR est non-AA >60 >60 ml/min/1.73m2    Calcium 8.0 (L) 8.5 - 10.1 MG/DL   PHOSPHORUS   Result Value Ref Range    Phosphorus 2.7 2.6 - 4.7 MG/DL   GLUCOSE, POC   Result Value Ref Range    Glucose (POC) 87 65 - 100 mg/dL    Performed by [employer]     GLUCOSE, POC   Result Value Ref Range    Glucose (POC) 161 (H) 65 - 100 mg/dL    Performed by [employer]    GLUCOSE, POC   Result Value Ref Range    Glucose (POC) 94 65 - 100 mg/dL    Performed by [employer]    GLUCOSE, POC   Result Value Ref Range    Glucose (POC) 184 (H) 65 - 100 mg/dL    Performed by Bernard Enamorado (PCT)        Physical Exam:     Visit Vitals  /78 (BP 1 Location: Right arm, BP Patient Position: At rest)   Pulse 78   Temp 98 °F (36.7 °C)   Resp 12   Ht 6' (1.829 m)   Wt 77.8 kg (171 lb 8.3 oz)   SpO2 98%   BMI 23.26 kg/m²     General:  Alert, cooperative, no distress. Head:  Normocephalic, without obvious abnormality, atraumatic. Eyes:  Conjunctivae/corneas clear. Lungs:  Heart:   Non labored breathing  Regular rate and rhythm, no carotid bruits   Abdomen:   Soft, non-distended   Extremities: Extremities normal, atraumatic, no cyanosis or edema. Pulses: 2+ and symmetric all extremities. Skin: Skin color, texture, turgor normal. No rashes or lesions.   Neurologic Exam     Gen: Attention normal             Language: naming, repetition, fluency normal             Memory: intact recent and remote memory  Cranial Nerves:  I: smell Not tested   II: visual fields Full to confrontation   II: pupils Equal, round, reactive to light   II: optic disc No papilledema   III,VII: ptosis none   III,IV,VI: extraocular muscles  Full ROM   V: mastication normal   V: facial light touch sensation  normal   VII: facial muscle function   symmetric   VIII: hearing symmetric   IX: soft palate elevation  normal   XI: trapezius strength  5/5   XI: sternocleidomastoid strength 5/5   XI: neck flexion strength  5/5   XII: tongue  midline     Motor: normal bulk and tone, no tremor              Strength: 5/5 all four extremities  (+) bradykinesia  (-) rigidity  Some masked facies  Sensory: intact to LT, PP, vibration, and temperature  Reflexes: 2+ throughout; Down going toes  Coordination: Good FTN and HTS  Gait: able to stand by himself, unstable           Impression:     Elaine Bond is a 70 y.o. male who  has a past medical history of Diabetes ([facility] (2008), [facility] (6/14/2019), PAF (paroxysmal atrial fibrillation) ([facility] (6/14/2019), Prostate cancer ([facility] (08/2000), and Stroke ([facility] (04/19/2015). who was being followed by neurology Dr Eagle Ackerman for gait disturbance. Work up done showed multiple strokes (L thalamic, bilateral karrie and bilateral frontal). Patient was sent to cardiology Dr Bonnie Aguilar who found paroxysmal afib and placed on Eliquis. There was a suspicion of Parkinsonism so patient was placed on a trial of Sinemet 25/100 TID. However, patient noted lightheadedness and diarrhea with no clear benefit. During visit with Cardiology Verdia Bears NP, patient was noted to be diaphoretic and became unresponsive lasting for 2 mins. EKG showed sinus tachycardia. Considerations include syncope due to medication (I.e. Sinemet, Metoprolol), underlying afib, diabetic cardiac autonomic neuropathy, dysautonomia from underlying ? parkinsonism. RECOMMENDATIONS  1. I had a long discussion with patient. Discussed diagnosis, prognosis, pathophysiology and available treatment. Reviewed test results. All questions were answered. 2. Agree with stopping Sinemet  3. Cardiology following for paroxysmal afib  4. Already on Eliquis for stroke prevention  5. Physical therapy for gait instability  6. Of note, it is unclear to me wether patient has degenerative type of parkinsonism or his bradykinesia is due to his multiple strokes (vascular etiology)  6.  If still no improvement despite above, consider ANS testing as out patient    Follow up with his neurologist Dr Junior Loyola    Please call for questions        Thank you for the consultation      David Madison MD  Diplomate, American Board of Psychiatry and Neurology  Diplomate, Neuromuscular Medicine  Diplomate, American Board of Electrodiagnostic Medicine    Greater than 50% of time spent counseling patient        CC: Rahul Canela MD  Fax: [phone]

## 2019-06-15 NOTE — PROGRESS NOTES
6/15/2019  2:11 PM    CM responded to transition of care consult for PT/OT HH. CM provided patient with list of [facility] providers and assisted him in competing the Freedom of Choice letter for his identified provider, Robby Tabor [facility]. Referral sent in [employer].     Care Management Interventions  PCP Verified by CM: Yes(eddie rocha no nn)  Mode of Transport at Discharge: Self  Transition of Care Consult (CM Consult): Discharge Planning  Current Support Network: Lives with Spouse  Confirm Follow Up Transport: Family  Plan discussed with Pt/Family/Caregiver: Yes  Freedom of Choice Offered: (S) Yes  Discharge Location  Discharge Placement: Home with home health

## 2019-06-17 ENCOUNTER — TELEPHONE (OUTPATIENT)
Dept: CARDIOLOGY CLINIC | Age: 79
End: 2019-06-17

## 2019-06-17 LAB
ATRIAL RATE: 78 BPM
CALCULATED P AXIS, ECG09: 50 DEGREES
CALCULATED R AXIS, ECG10: 58 DEGREES
CALCULATED T AXIS, ECG11: 65 DEGREES
DIAGNOSIS, 93000: NORMAL
P-R INTERVAL, ECG05: 146 MS
Q-T INTERVAL, ECG07: 400 MS
QRS DURATION, ECG06: 92 MS
QTC CALCULATION (BEZET), ECG08: 456 MS
VENTRICULAR RATE, ECG03: 78 BPM

## 2019-06-17 NOTE — TELEPHONE ENCOUNTER
Patient's wife, Marilu Villavicencio, states that the patient has been taking Eliquis and she noticed a skin reaction. She thinks it is from Eliquis. Please advise.     Phone #: [phone]  Thanks

## 2019-06-18 NOTE — TELEPHONE ENCOUNTER
Spoke with patient's spouse. Stated Mr. Colby Khan has \"welps\" located on the back of his left shoulder, noticed yesterday. Patient inquired if it could be a reaction to his Eliquis. Negative for skin irritation located on other parts of his body. Negative for itching. Negative for patient scratching area. Mrs. Colby Khan will continue to monitor area for progress and notify our office if needed. Stated the area looks better today. Will follow up as scheduled.

## 2019-07-05 ENCOUNTER — OFFICE VISIT (OUTPATIENT)
Dept: CARDIOLOGY CLINIC | Age: 79
End: 2019-07-05

## 2019-07-05 VITALS
HEART RATE: 80 BPM | HEIGHT: 72 IN | WEIGHT: 170.2 LBS | DIASTOLIC BLOOD PRESSURE: 84 MMHG | RESPIRATION RATE: 16 BRPM | SYSTOLIC BLOOD PRESSURE: 152 MMHG | BODY MASS INDEX: 23.05 KG/M2 | OXYGEN SATURATION: 98 %

## 2019-07-05 DIAGNOSIS — Z86.73 HISTORY OF CVA (CEREBROVASCULAR ACCIDENT): Primary | ICD-10-CM

## 2019-07-05 NOTE — PROGRESS NOTES
Room # 5    [facility]: 6/14-6/15/19-syncope    Eliquis too costly. Denies any cardiac complaints at this time.      Sleep study to be done 7/15/19    Visit Vitals  /84 (BP 1 Location: Left arm, BP Patient Position: Sitting)   Pulse 80   Resp 16   Ht 6' (1.829 m)   Wt 170 lb 3.2 oz (77.2 kg)   SpO2 98%   BMI 23.08 kg/m²

## 2019-07-05 NOTE — PROGRESS NOTES
HISTORY OF PRESENTING ILLNESS      Atiya Limon is a 70 y.o. male with history of diabetes, prostate cancer and recurrent stroke/TIA referred for evaluation of an underlying arrhythmic etiology as a  of his recurrent cryptogenic stroke. He wore 30 day event monitor which demonstrated AF with RVR. His aspirin was stopped, Toprol 25mg daily was started as well as Eliquis 5mg BID for CVA risk reduction. He experienced diarrhea two weeks after starting metoprolol and thus discontinued this. His wife, main caretaker,  subsequently discontinued sinemet believing that may also be contributing to GI issues. His diarrhea has resolved and sinemet was restarted yesterday, although he only took one dose. Difficult to determine whether diarrhea was r/t sinemet or metoprolol since they were both discontinued. He had vagal syncope during our last clinic visit. He denies recurrent syncope and his syncope was felt to be secondary to Sinemet which is now been discontinued.        ACTIVE PROBLEM LIST     Patient Active Problem List    Diagnosis Date Noted    Syncope 06/14/2019    Elevated serum creatinine 06/14/2019    History of completed stroke 06/14/2019    Prostate cancer ([address] 06/14/2019    PAF (paroxysmal atrial fibrillation) ([address] 06/14/2019    Type 2 diabetes mellitus with neurologic complication ([address] 19/71/3890    Diabetic neuropathy ([address] 06/14/2019           PAST MEDICAL HISTORY     Past Medical History:   Diagnosis Date    Diabetes ([address] 2008    Diabetic neuropathy ([address] 6/14/2019    PAF (paroxysmal atrial fibrillation) ([address] 6/14/2019    Prostate cancer ([address] 08/2000    Stroke ([address] 04/19/2015           PAST SURGICAL HISTORY     Past Surgical History:   Procedure Laterality Date    HX PROSTATECTOMY  08/2000    VASCULAR SURGERY PROCEDURE UNLIST  12/09/2016    carotid artery          ALLERGIES     Allergies   Allergen Reactions    Ciprofloxacin Shortness of Breath    Levaquin [Levofloxacin] Shortness of Breath    Guaifenesin Unknown (comments)     Used with Cipro so unsure of true allergy          FAMILY HISTORY     Family History   Problem Relation Age of Onset    Heart Disease Mother     Prostate Cancer Father     negative for cardiac disease       SOCIAL HISTORY     Social History     Socioeconomic History    Marital status:      Spouse name: Not on file    Number of children: Not on file    Years of education: Not on file    Highest education level: Not on file   Tobacco Use    Smoking status: Never Smoker    Smokeless tobacco: Never Used   Substance and Sexual Activity    Alcohol use: Not Currently    Drug use: Never         MEDICATIONS     Current Outpatient Medications   Medication Sig    apixaban (ELIQUIS) 5 mg tablet Take 1 Tab by mouth two (2) times a day.  losartan (COZAAR) 50 mg tablet Take 1 Tab by mouth daily.  metFORMIN (GLUCOPHAGE) 1,000 mg tablet Take 1 Tab by mouth two (2) times daily (with meals).  meclizine (ANTIVERT) 12.5 mg tablet Take 6.25-12.5 mg by mouth three (3) times daily as needed for Dizziness. No current facility-administered medications for this visit. Facility-Administered Medications Ordered in Other Visits   Medication Dose Route Frequency    sodium chloride (NS) flush 10 mL  10 mL IntraVENous PRN       I have reviewed the nurses notes, vitals, problem list, allergy list, medical history, family, social history and medications. REVIEW OF SYMPTOMS      General: Pt denies excessive weight gain or loss. Pt is able to conduct ADL's  HEENT: Denies blurred vision, headaches, hearing loss, epistaxis and difficulty swallowing. Respiratory: Denies cough, congestion, shortness of breath, SHRESTHA, wheezing or stridor.   Cardiovascular: Denies precordial pain, palpitations, edema or PND  Gastrointestinal: Denies poor appetite, indigestion, abdominal pain or blood in stool  Genitourinary: Denies hematuria, dysuria, increased urinary frequency  Musculoskeletal: Denies joint pain or swelling from muscles or joints  Neurologic: Denies tremor, paresthesias, headache, or sensory motor disturbance  Psychiatric: Denies confusion, insomnia, depression  Integumentray: Denies rash, itching or ulcers. Hematologic: Denies easy bruising, bleeding       PHYSICAL EXAMINATION      There were no vitals filed for this visit. General: Well developed, in no acute distress. HEENT: No jaundice, oral mucosa moist, no oral ulcers  Neck: Supple, no stiffness, no lymphadenopathy, supple  Heart:  Normal S1/S2 negative S3 or S4. Regular, no murmur, gallop or rub, no jugular venous distention  Respiratory: Clear bilaterally x 4, no wheezing or rales  Abdomen:   Soft, non-tender, bowel sounds are active.   Extremities:  No edema, normal cap refill, no cyanosis. Musculoskeletal: No clubbing, no deformities  Neuro: A&Ox3, speech clear, gait stable, cooperative, no focal neurologic deficits  Skin: Skin color is normal. No rashes or lesions. Non diaphoretic, moist.  Vascular: 2+ pulses symmetric in all extremities       DIAGNOSTIC DATA      EKG:        LABORATORY DATA      Lab Results   Component Value Date/Time    WBC 6.4 06/15/2019 01:14 AM    HGB 12.0 (L) 06/15/2019 01:14 AM    HCT 36.7 06/15/2019 01:14 AM    PLATELET 977 81/40/5547 01:14 AM    MCV 88.9 06/15/2019 01:14 AM      Lab Results   Component Value Date/Time    Sodium 142 06/15/2019 01:14 AM    Potassium 3.8 06/15/2019 01:14 AM    Chloride 107 06/15/2019 01:14 AM    CO2 28 06/15/2019 01:14 AM    Anion gap 7 06/15/2019 01:14 AM    Glucose 106 (H) 06/15/2019 01:14 AM    BUN 17 06/15/2019 01:14 AM    Creatinine 1.06 06/15/2019 01:14 AM    BUN/Creatinine ratio 16 06/15/2019 01:14 AM    GFR est AA >60 06/15/2019 01:14 AM    GFR est non-AA >60 06/15/2019 01:14 AM    Calcium 8.0 (L) 06/15/2019 01:14 AM    Bilirubin, total 0.9 06/14/2019 11:46 AM    AST (SGOT) 9 (L) 06/14/2019 11:46 AM    Alk.  phosphatase 81 06/14/2019 11:46 AM    Protein, total 6.8 06/14/2019 11:46 AM    Albumin 3.6 06/14/2019 11:46 AM    Globulin 3.2 06/14/2019 11:46 AM    A-G Ratio 1.1 06/14/2019 11:46 AM    ALT (SGPT) 13 06/14/2019 11:46 AM           ASSESSMENT      1.  Stroke              A. Recurrent              B. Cryptogenic  2.  Diabetes mellitus  3. Atrial fibrillation         PLAN     Patient reports his Eliquis is cost ineffective and wishes to try an alternate agent. We will try Xarelto 20 mg daily. If this is also expensive will consider either warfarin or left atrial appendage occlusion. FOLLOW-UP     1 month unless opts for WATCHMAN      Thank you, Lou Raza MD and Dr. Abram Slade for allowing me to participate in the care of this extraordinarily pleasant male. Please do not hesitate to contact me for further questions/concerns.          Edi Dunbar MD  Cardiac Electrophysiology / Cardiology    [address]  [address] [place] [address]  [facility] [address]  [phone] / [phone] Fax   [phone] / [phone] Fax

## 2019-07-08 ENCOUNTER — TELEPHONE (OUTPATIENT)
Dept: CARDIOLOGY CLINIC | Age: 79
End: 2019-07-08

## 2019-07-08 NOTE — TELEPHONE ENCOUNTER
Patients wife, Serafin Cm,  would like for you to call her. Did not want to leave any specific information with me. Requested to speak with Justine Mathis [facility]     Phone: [phone]

## 2019-07-09 NOTE — TELEPHONE ENCOUNTER
Received call from patient's wife Darling, patient ID verified using two patient identifiers. She states she has filled out and sent Patient Assistance paperwork for Eliquis to [address] [place] [address] but in the meantime the patient will need samples of the Xarelto that Dr. Michelle Pires has ordered. She also states she and Mr. Jered Huerta are considering the Watchman that Dr. Michelle Pires talked about at the last appointment. Advised  that I would put samples of the Xarelto aside for them and include in the bag a brochure about the Watchman device. Mrs. Jered Huerta is agreeable to this plan and she will  samples later this week.

## 2019-07-09 NOTE — TELEPHONE ENCOUNTER
Requested Prescriptions     Signed Prescriptions Disp Refills    rivaroxaban (XARELTO) 20 mg tab tablet 28 Tab 0     Sig: Take 1 Tab by mouth daily (with dinner). Authorizing Provider: Phil Hamilton     Ordering User: Abbie MIRANDA     Samples provided per verbal order Dr. Patricia Berry.

## 2019-07-17 ENCOUNTER — TELEPHONE (OUTPATIENT)
Dept: CARDIOLOGY CLINIC | Age: 79
End: 2019-07-17

## 2019-07-17 NOTE — TELEPHONE ENCOUNTER
Received call from Ita Lozano from [facility], patient ID verified using two patient identifiers. Per Ita Lozano patient does not meet the  financial requirements for patient assistance for his Eliquis.

## 2019-07-24 ENCOUNTER — TELEPHONE (OUTPATIENT)
Dept: CARDIOLOGY CLINIC | Age: 79
End: 2019-07-24

## 2019-07-24 DIAGNOSIS — I48.0 PAF (PAROXYSMAL ATRIAL FIBRILLATION) (HCC): Primary | Chronic | ICD-10-CM

## 2019-07-24 RX ORDER — DILTIAZEM HYDROCHLORIDE 120 MG/1
120 CAPSULE, COATED, EXTENDED RELEASE ORAL DAILY
Qty: 30 CAP | Refills: 3 | Status: SHIPPED | OUTPATIENT
Start: 2019-07-24 | End: 2019-12-03 | Stop reason: SDUPTHER

## 2019-07-24 NOTE — TELEPHONE ENCOUNTER
Patient's wife stated that since he started metoprolol he has had diarrhea and she is \"tired of cleaning up after him\". Please advise.     Phone #: [phone]  Thanks

## 2019-07-24 NOTE — TELEPHONE ENCOUNTER
Spoke with patient's spouse. Considering trying diltizem 120mg in place of metoprolol. Will send to pharmacy.

## 2019-12-03 DIAGNOSIS — I48.0 PAF (PAROXYSMAL ATRIAL FIBRILLATION) (HCC): Chronic | ICD-10-CM

## 2019-12-03 RX ORDER — DILTIAZEM HYDROCHLORIDE 120 MG/1
CAPSULE, EXTENDED RELEASE ORAL
Qty: 30 CAP | Refills: 3 | Status: SHIPPED | OUTPATIENT
Start: 2019-12-03

## 2020-06-30 ENCOUNTER — HOSPITAL ENCOUNTER (OUTPATIENT)
Dept: GENERAL RADIOLOGY | Age: 80
Discharge: HOME | End: 2020-06-30
Attending: NURSE PRACTITIONER
Payer: MEDICARE

## 2020-06-30 DIAGNOSIS — M79.652 PAIN IN LEFT THIGH: ICD-10-CM

## 2020-06-30 PROCEDURE — 73502 X-RAY EXAM HIP UNI 2-3 VIEWS: CPT

## 2020-12-16 ENCOUNTER — HOSPITAL ENCOUNTER (OUTPATIENT)
Dept: GENERAL RADIOLOGY | Age: 80
Discharge: HOME | End: 2020-12-16
Attending: NURSE PRACTITIONER | Admitting: NURSE PRACTITIONER
Payer: MEDICARE

## 2020-12-16 ENCOUNTER — TRANSCRIBE ORDER (OUTPATIENT)
Dept: GENERAL RADIOLOGY | Age: 80
End: 2020-12-16

## 2020-12-16 DIAGNOSIS — R07.81 PLEURODYNIA: Primary | ICD-10-CM

## 2020-12-16 DIAGNOSIS — R07.81 PLEURODYNIA: ICD-10-CM

## 2020-12-16 PROCEDURE — 71046 X-RAY EXAM CHEST 2 VIEWS: CPT

## 2021-01-27 ENCOUNTER — TRANSCRIBE ORDER (OUTPATIENT)
Dept: GENERAL RADIOLOGY | Age: 81
End: 2021-01-27

## 2021-01-27 ENCOUNTER — HOSPITAL ENCOUNTER (OUTPATIENT)
Dept: GENERAL RADIOLOGY | Age: 81
Discharge: HOME | End: 2021-01-27
Attending: NURSE PRACTITIONER
Payer: MEDICARE

## 2021-01-27 DIAGNOSIS — J98.11 ATELECTASIS: ICD-10-CM

## 2021-01-27 DIAGNOSIS — J98.11 ATELECTASIS: Primary | ICD-10-CM

## 2021-01-27 PROCEDURE — 71046 X-RAY EXAM CHEST 2 VIEWS: CPT

## 2022-03-18 PROBLEM — R55 SYNCOPE: Status: ACTIVE | Noted: 2019-06-14

## 2022-03-18 PROBLEM — C61 PROSTATE CANCER (HCC): Status: ACTIVE | Noted: 2019-06-14

## 2022-03-18 PROBLEM — R79.89 ELEVATED SERUM CREATININE: Status: ACTIVE | Noted: 2019-06-14

## 2022-03-19 PROBLEM — Z86.73 HISTORY OF COMPLETED STROKE: Status: ACTIVE | Noted: 2019-06-14

## 2022-03-19 PROBLEM — I48.0 PAF (PAROXYSMAL ATRIAL FIBRILLATION) (HCC): Status: ACTIVE | Noted: 2019-06-14

## 2022-03-20 PROBLEM — E11.49 TYPE 2 DIABETES MELLITUS WITH NEUROLOGIC COMPLICATION (HCC): Status: ACTIVE | Noted: 2019-06-14

## 2022-03-20 PROBLEM — E11.40 DIABETIC NEUROPATHY (HCC): Status: ACTIVE | Noted: 2019-06-14

## 2022-09-13 ENCOUNTER — HOSPITAL ENCOUNTER (OUTPATIENT)
Dept: ULTRASOUND IMAGING | Age: 82
Discharge: HOME | End: 2022-09-13
Attending: NURSE PRACTITIONER
Payer: MEDICARE

## 2022-09-13 ENCOUNTER — TRANSCRIBE ORDER (OUTPATIENT)
Dept: SCHEDULING | Age: 82
End: 2022-09-13

## 2022-09-13 DIAGNOSIS — R60.0 EDEMA OF LOWER EXTREMITY: Primary | ICD-10-CM

## 2022-09-13 DIAGNOSIS — R60.0 LOCALIZED EDEMA: ICD-10-CM

## 2022-09-13 PROCEDURE — 93970 EXTREMITY STUDY: CPT

## 2023-05-19 RX ORDER — LOSARTAN POTASSIUM 50 MG/1
1 TABLET ORAL DAILY
COMMUNITY
Start: 2019-03-29

## 2023-05-19 RX ORDER — MECLIZINE HCL 12.5 MG/1
6.25-12.5 TABLET ORAL 3 TIMES DAILY PRN
COMMUNITY

## 2023-05-19 RX ORDER — DILTIAZEM HYDROCHLORIDE 120 MG/1
1 CAPSULE, COATED, EXTENDED RELEASE ORAL DAILY
COMMUNITY
Start: 2019-12-03